# Patient Record
Sex: FEMALE
[De-identification: names, ages, dates, MRNs, and addresses within clinical notes are randomized per-mention and may not be internally consistent; named-entity substitution may affect disease eponyms.]

---

## 2017-05-27 ENCOUNTER — HEALTH MAINTENANCE LETTER (OUTPATIENT)
Age: 57
End: 2017-05-27

## 2019-02-06 ENCOUNTER — DIAGNOSTIC TRANS (OUTPATIENT)
Dept: OTHER | Age: 59
End: 2019-02-06

## 2019-02-06 ENCOUNTER — HOSPITAL (OUTPATIENT)
Dept: OTHER | Age: 59
End: 2019-02-06

## 2019-02-06 LAB
ALBUMIN SERPL-MCNC: 3.2 GM/DL (ref 3.6–5.1)
ALBUMIN/GLOB SERPL: 0.6 {RATIO} (ref 1–2.4)
ALP SERPL-CCNC: 86 UNIT/L (ref 45–117)
ALT SERPL-CCNC: 52 UNIT/L
ANALYZER ANC (IANC): ABNORMAL
ANION GAP SERPL CALC-SCNC: 10 MMOL/L (ref 10–20)
APTT PPP: 28 SECONDS (ref 22–32)
APTT PPP: NORMAL S
AST SERPL-CCNC: 46 UNIT/L
BASOPHILS # BLD: 0 THOUSAND/MCL (ref 0–0.3)
BASOPHILS NFR BLD: 0 %
BILIRUB SERPL-MCNC: 0.3 MG/DL (ref 0.2–1)
BUN SERPL-MCNC: 55 MG/DL (ref 6–20)
BUN/CREAT SERPL: 35 (ref 7–25)
CALCIUM SERPL-MCNC: 9.8 MG/DL (ref 8.4–10.2)
CHLORIDE: 103 MMOL/L (ref 98–107)
CO2 SERPL-SCNC: 24 MMOL/L (ref 21–32)
CREAT SERPL-MCNC: 1.57 MG/DL (ref 0.51–0.95)
DIFFERENTIAL METHOD BLD: ABNORMAL
EOSINOPHIL # BLD: 0 THOUSAND/MCL (ref 0.1–0.5)
EOSINOPHIL NFR BLD: 0 %
ERYTHROCYTE [DISTWIDTH] IN BLOOD: 16 % (ref 11–15)
ETHANOL SERPL-MCNC: NORMAL MG/DL
GLOBULIN SER-MCNC: 5 GM/DL (ref 2–4)
GLUCOSE SERPL-MCNC: 124 MG/DL (ref 65–99)
HEMATOCRIT: 33.2 % (ref 36–46.5)
HGB BLD-MCNC: 10.4 GM/DL (ref 12–15.5)
IMM GRANULOCYTES # BLD AUTO: 0.1 THOUSAND/MCL (ref 0–0.2)
IMM GRANULOCYTES NFR BLD: 1 %
INR PPP: 1
LYMPHOCYTES # BLD: 0.8 THOUSAND/MCL (ref 1–4)
LYMPHOCYTES NFR BLD: 8 %
MCH RBC QN AUTO: 31.1 PG (ref 26–34)
MCHC RBC AUTO-ENTMCNC: 31.3 GM/DL (ref 32–36.5)
MCV RBC AUTO: 99.4 FL (ref 78–100)
MONOCYTES # BLD: 0.6 THOUSAND/MCL (ref 0.3–0.9)
MONOCYTES NFR BLD: 6 %
NEUTROPHILS # BLD: 8.5 THOUSAND/MCL (ref 1.8–7.7)
NEUTROPHILS NFR BLD: 85 %
NEUTS SEG NFR BLD: ABNORMAL %
NRBC (NRBCRE): 0 /100 WBC
PLATELET # BLD: 244 THOUSAND/MCL (ref 140–450)
POTASSIUM SERPL-SCNC: 6.2 MMOL/L (ref 3.4–5.1)
PROT SERPL-MCNC: 8.2 GM/DL (ref 6.4–8.2)
PROTHROMBIN TIME: 10.4 SECONDS (ref 9.7–11.8)
PROTHROMBIN TIME: NORMAL
RBC # BLD: 3.34 MILLION/MCL (ref 4–5.2)
SODIUM SERPL-SCNC: 131 MMOL/L (ref 135–145)
TROPONIN I SERPL HS-MCNC: <0.02 NG/ML
WBC # BLD: 10 THOUSAND/MCL (ref 4.2–11)

## 2019-02-07 ENCOUNTER — HOSPITAL (OUTPATIENT)
Dept: OTHER | Age: 59
End: 2019-02-07
Attending: INTERNAL MEDICINE

## 2019-02-07 ENCOUNTER — HOSPITAL (OUTPATIENT)
Dept: OTHER | Age: 59
End: 2019-02-07

## 2019-02-07 LAB
ALBUMIN SERPL-MCNC: 2.7 GM/DL (ref 3.6–5.1)
ALBUMIN SERPL-MCNC: 2.9 GM/DL (ref 3.6–5.1)
ALBUMIN/GLOB SERPL: 0.6 {RATIO} (ref 1–2.4)
ALBUMIN/GLOB SERPL: 0.6 {RATIO} (ref 1–2.4)
ALP SERPL-CCNC: 75 UNIT/L (ref 45–117)
ALP SERPL-CCNC: 83 UNIT/L (ref 45–117)
ALT SERPL-CCNC: 41 UNIT/L
ALT SERPL-CCNC: 44 UNIT/L
AMMONIA PLAS-SCNC: 54 MCMOL/L
AMORPH SED URNS QL MICRO: NORMAL
AMPHETAMINES UR QL SCN>500 NG/ML: NEGATIVE
ANALYZER ANC (IANC): ABNORMAL
ANALYZER ANC (IANC): ABNORMAL
ANION GAP SERPL CALC-SCNC: 6 MMOL/L (ref 10–20)
ANION GAP SERPL CALC-SCNC: 8 MMOL/L (ref 10–20)
ANION GAP SERPL CALC-SCNC: 9 MMOL/L (ref 10–20)
APPEARANCE UR: CLEAR
APTT PPP: 27 SECONDS (ref 22–32)
APTT PPP: NORMAL S
AST SERPL-CCNC: 44 UNIT/L
AST SERPL-CCNC: 46 UNIT/L
BACTERIA #/AREA URNS HPF: NORMAL /HPF
BARBITURATES UR QL SCN>200 NG/ML: NEGATIVE
BASE DEFICIT BLDA-SCNC: 1 MMOL/L (ref 0–2)
BASE DEFICIT BLDA-SCNC: 1 MMOL/L (ref 0–2)
BASE DEFICIT BLDA-SCNC: 2 MMOL/L (ref 0–2)
BASE DEFICIT BLDA-SCNC: 2 MMOL/L (ref 0–2)
BASE EXCESS BLDA CALC-SCNC: ABNORMAL MMOL/L
BASOPHILS # BLD: 0 THOUSAND/MCL (ref 0–0.3)
BASOPHILS # BLD: 0 THOUSAND/MCL (ref 0–0.3)
BASOPHILS NFR BLD: 0 %
BASOPHILS NFR BLD: 0 %
BDY SITE: ABNORMAL
BENZODIAZ UR QL SCN>200 NG/ML: NEGATIVE
BILIRUB SERPL-MCNC: 0.4 MG/DL (ref 0.2–1)
BILIRUB SERPL-MCNC: 0.5 MG/DL (ref 0.2–1)
BILIRUB UR QL: NEGATIVE
BODY TEMPERATURE: 36.9 DEGREES
BODY TEMPERATURE: 37 DEGREES
BUN SERPL-MCNC: 40 MG/DL (ref 6–20)
BUN SERPL-MCNC: 46 MG/DL (ref 6–20)
BUN SERPL-MCNC: 46 MG/DL (ref 6–20)
BUN/CREAT SERPL: 36 (ref 7–25)
BUN/CREAT SERPL: 37 (ref 7–25)
BUN/CREAT SERPL: 38 (ref 7–25)
BZE UR QL SCN>150 NG/ML: NEGATIVE
CA-I BLD ISE-SCNC: 1.49 MMOL/L (ref 1.15–1.29)
CA-I BLD ISE-SCNC: 1.54 MMOL/L (ref 1.15–1.29)
CA-I BLDA-SCNC: 13 % (ref 15–23)
CA-I BLDA-SCNC: 14 % (ref 15–23)
CALCIUM SERPL-MCNC: 9.7 MG/DL (ref 8.4–10.2)
CALCIUM SERPL-MCNC: 9.8 MG/DL (ref 8.4–10.2)
CALCIUM SERPL-MCNC: 9.9 MG/DL (ref 8.4–10.2)
CANNABINOIDS UR QL SCN>50 NG/ML: NEGATIVE
CAOX CRY URNS QL MICRO: NORMAL
CHLORIDE: 107 MMOL/L (ref 98–107)
CHLORIDE: 108 MMOL/L (ref 98–107)
CHLORIDE: 111 MMOL/L (ref 98–107)
CK SERPL-CCNC: 836 UNIT/L (ref 26–192)
CO2 SERPL-SCNC: 27 MMOL/L (ref 21–32)
CO2 SERPL-SCNC: 28 MMOL/L (ref 21–32)
CO2 SERPL-SCNC: 28 MMOL/L (ref 21–32)
COHGB MFR BLD: 1.4 %
COHGB MFR BLD: 2.3 %
COLOR UR: YELLOW
CONDITION: ABNORMAL
CREAT SERPL-MCNC: 1.05 MG/DL (ref 0.51–0.95)
CREAT SERPL-MCNC: 1.24 MG/DL (ref 0.51–0.95)
CREAT SERPL-MCNC: 1.27 MG/DL (ref 0.51–0.95)
DIFFERENTIAL METHOD BLD: ABNORMAL
DIFFERENTIAL METHOD BLD: ABNORMAL
EOSINOPHIL # BLD: 0 THOUSAND/MCL (ref 0.1–0.5)
EOSINOPHIL # BLD: 0 THOUSAND/MCL (ref 0.1–0.5)
EOSINOPHIL NFR BLD: 0 %
EOSINOPHIL NFR BLD: 0 %
EPITH CASTS #/AREA URNS LPF: NORMAL /[LPF]
ERYTHROCYTE [DISTWIDTH] IN BLOOD: 16.1 % (ref 11–15)
ERYTHROCYTE [DISTWIDTH] IN BLOOD: 16.3 % (ref 11–15)
FATTY CASTS #/AREA URNS LPF: NORMAL /[LPF]
GLOBULIN SER-MCNC: 4.6 GM/DL (ref 2–4)
GLOBULIN SER-MCNC: 4.8 GM/DL (ref 2–4)
GLUCOSE BLDC GLUCOMTR-MCNC: 136 MG/DL (ref 65–99)
GLUCOSE BLDC GLUCOMTR-MCNC: 140 MG/DL (ref 65–99)
GLUCOSE BLDC GLUCOMTR-MCNC: 150 MG/DL (ref 65–99)
GLUCOSE BLDC GLUCOMTR-MCNC: 184 MG/DL (ref 65–99)
GLUCOSE BLDC GLUCOMTR-MCNC: 212 MG/DL (ref 65–99)
GLUCOSE BLDC GLUCOMTR-MCNC: 58 MG/DL (ref 65–99)
GLUCOSE BLDC GLUCOMTR-MCNC: 62 MG/DL (ref 65–99)
GLUCOSE SERPL-MCNC: 110 MG/DL (ref 65–99)
GLUCOSE SERPL-MCNC: 126 MG/DL (ref 65–99)
GLUCOSE SERPL-MCNC: 68 MG/DL (ref 65–99)
GLUCOSE UR-MCNC: NEGATIVE MG/DL
GRAN CASTS #/AREA URNS LPF: NORMAL /[LPF]
HCO3 BLDA-SCNC: 26 MMOL/L (ref 22–28)
HCO3 BLDA-SCNC: 26 MMOL/L (ref 22–28)
HCO3 BLDA-SCNC: 27 MMOL/L (ref 22–28)
HCO3 BLDA-SCNC: 27 MMOL/L (ref 22–28)
HEMATOCRIT: 30.5 % (ref 36–46.5)
HEMATOCRIT: 32.5 % (ref 36–46.5)
HGB BLD-MCNC: 10.1 GM/DL (ref 12–15.5)
HGB BLD-MCNC: 10.4 GM/DL (ref 12–15.5)
HGB BLD-MCNC: 9.5 GM/DL (ref 12–15.5)
HGB BLD-MCNC: 9.5 GM/DL (ref 12–15.5)
HGB UR QL: NEGATIVE
HOROWITZ INDEX BLD+IHG-RTO: ABNORMAL MM[HG]
HYALINE CASTS #/AREA URNS LPF: NORMAL /LPF (ref 0–5)
IMM GRANULOCYTES # BLD AUTO: 0 THOUSAND/MCL (ref 0–0.2)
IMM GRANULOCYTES # BLD AUTO: 0.1 THOUSAND/MCL (ref 0–0.2)
IMM GRANULOCYTES NFR BLD: 1 %
IMM GRANULOCYTES NFR BLD: 1 %
INR PPP: 1
KETONES UR-MCNC: NEGATIVE MG/DL
LACTATE BLDA-MCNC: 0.4 MMOL/L
LEUKOCYTE ESTERASE UR QL STRIP: NEGATIVE
LYMPHOCYTES # BLD: 0.9 THOUSAND/MCL (ref 1–4)
LYMPHOCYTES # BLD: 1.1 THOUSAND/MCL (ref 1–4)
LYMPHOCYTES NFR BLD: 11 %
LYMPHOCYTES NFR BLD: 13 %
MAGNESIUM SERPL-MCNC: 2.2 MG/DL (ref 1.7–2.4)
MCH RBC QN AUTO: 30.8 PG (ref 26–34)
MCH RBC QN AUTO: 31.2 PG (ref 26–34)
MCHC RBC AUTO-ENTMCNC: 31.1 GM/DL (ref 32–36.5)
MCHC RBC AUTO-ENTMCNC: 31.1 GM/DL (ref 32–36.5)
MCV RBC AUTO: 100.3 FL (ref 78–100)
MCV RBC AUTO: 99 FL (ref 78–100)
METHGB MFR BLD: 0.1 %
METHGB MFR BLD: 0.5 %
MIXED CELL CASTS #/AREA URNS LPF: NORMAL /[LPF]
MONOCYTES # BLD: 0.8 THOUSAND/MCL (ref 0.3–0.9)
MONOCYTES # BLD: 0.9 THOUSAND/MCL (ref 0.3–0.9)
MONOCYTES NFR BLD: 10 %
MONOCYTES NFR BLD: 9 %
MUCOUS THREADS URNS QL MICRO: PRESENT
NEUTROPHILS # BLD: 6.6 THOUSAND/MCL (ref 1.8–7.7)
NEUTROPHILS # BLD: 6.6 THOUSAND/MCL (ref 1.8–7.7)
NEUTROPHILS NFR BLD: 76 %
NEUTROPHILS NFR BLD: 79 %
NEUTS SEG NFR BLD: ABNORMAL %
NEUTS SEG NFR BLD: ABNORMAL %
NITRITE UR QL: NEGATIVE
NRBC (NRBCRE): 0 /100 WBC
NRBC (NRBCRE): 0 /100 WBC
NT-PROBNP SERPL-MCNC: 176 PG/ML
OPIATES UR QL SCN>300 NG/ML: NEGATIVE
OXYHGB MFR BLD: 96 % (ref 94–98)
OXYHGB MFR BLD: 96.3 % (ref 94–98)
PAO2 / FIO2 RATIO (RPFR): 268 (ref 300–500)
PAO2 / FIO2 RATIO (RPFR): 320 (ref 300–500)
PAO2 / FIO2 RATIO (RPFR): 358 (ref 300–500)
PAO2 / FIO2 RATIO (RPFR): ABNORMAL
PCO2 BLDA: 53 MM HG (ref 32–45)
PCO2 BLDA: 57 MM HG (ref 32–45)
PCO2 BLDA: 65 MM HG (ref 32–45)
PCO2 BLDA: 65 MM HG (ref 32–45)
PCP UR QL SCN>25 NG/ML: NEGATIVE
PH BLDA: 7.23 UNIT (ref 7.35–7.45)
PH BLDA: 7.23 UNIT (ref 7.35–7.45)
PH BLDA: 7.27 UNIT (ref 7.35–7.45)
PH BLDA: 7.29 UNIT (ref 7.35–7.45)
PH UR: 5 UNIT (ref 5–7)
PHOSPHATE SERPL-MCNC: 2.1 MG/DL (ref 2.4–4.7)
PLATELET # BLD: 198 THOUSAND/MCL (ref 140–450)
PLATELET # BLD: 216 THOUSAND/MCL (ref 140–450)
PO2 BLDA: 107 MM HG (ref 83–108)
PO2 BLDA: 127 MM HG (ref 83–108)
PO2 BLDA: 143 MM HG (ref 83–108)
PO2 BLDA: 83 MM HG (ref 83–108)
POTASSIUM BLD-SCNC: 5.3 MMOL/L (ref 3.4–5.1)
POTASSIUM BLD-SCNC: 5.4 MMOL/L (ref 3.4–5.1)
POTASSIUM SERPL-SCNC: 4.8 MMOL/L (ref 3.4–5.1)
POTASSIUM SERPL-SCNC: 5 MMOL/L (ref 3.4–5.1)
POTASSIUM SERPL-SCNC: 5.2 MMOL/L (ref 3.4–5.1)
PROCALCITONIN SERPL IA-MCNC: 0.11 NG/ML
PROT SERPL-MCNC: 7.3 GM/DL (ref 6.4–8.2)
PROT SERPL-MCNC: 7.7 GM/DL (ref 6.4–8.2)
PROT UR QL: NEGATIVE MG/DL
PROTHROMBIN TIME: 10.3 SECONDS (ref 9.7–11.8)
PROTHROMBIN TIME: NORMAL
RBC # BLD: 3.08 MILLION/MCL (ref 4–5.2)
RBC # BLD: 3.24 MILLION/MCL (ref 4–5.2)
RBC #/AREA URNS HPF: NORMAL /HPF (ref 0–3)
RBC CASTS #/AREA URNS LPF: NORMAL /[LPF]
RENAL EPI CELLS #/AREA URNS HPF: NORMAL /[HPF]
SAO2 % BLDA: 94 % (ref 95–99)
SAO2 % BLDA: 98 % (ref 95–99)
SAO2 % BLDA: 99 % (ref 95–99)
SAO2 % BLDA: 99 % (ref 95–99)
SODIUM BLD-SCNC: 137 MMOL/L (ref 135–145)
SODIUM BLD-SCNC: 139 MMOL/L (ref 135–145)
SODIUM SERPL-SCNC: 137 MMOL/L (ref 135–145)
SODIUM SERPL-SCNC: 140 MMOL/L (ref 135–145)
SODIUM SERPL-SCNC: 140 MMOL/L (ref 135–145)
SP GR UR: 1.02 (ref 1–1.03)
SPECIMEN SOURCE: NORMAL
SPERM URNS QL MICRO: NORMAL
SQUAMOUS #/AREA URNS HPF: NORMAL /HPF (ref 0–5)
T VAGINALIS URNS QL MICRO: NORMAL
TRI-PHOS CRY URNS QL MICRO: NORMAL
TROPONIN I SERPL HS-MCNC: <0.02 NG/ML
TSH SERPL-ACNC: 2.22 MCUNIT/ML (ref 0.35–5)
URATE CRY URNS QL MICRO: NORMAL
UROBILINOGEN UR QL: 0.2 MG/DL (ref 0–1)
WAXY CASTS #/AREA URNS LPF: NORMAL /[LPF]
WBC # BLD: 8.3 THOUSAND/MCL (ref 4.2–11)
WBC # BLD: 8.7 THOUSAND/MCL (ref 4.2–11)
WBC #/AREA URNS HPF: NORMAL /HPF (ref 0–5)
WBC CASTS #/AREA URNS LPF: NORMAL /[LPF]
YEAST HYPHAE URNS QL MICRO: NORMAL
YEAST URNS QL MICRO: NORMAL

## 2019-02-08 LAB
ALBUMIN SERPL-MCNC: 2.7 GM/DL (ref 3.6–5.1)
ALBUMIN/GLOB SERPL: 0.4 {RATIO} (ref 1–2.4)
ALP SERPL-CCNC: 70 UNIT/L (ref 45–117)
ALT SERPL-CCNC: 40 UNIT/L
ANALYZER ANC (IANC): ABNORMAL
ANION GAP SERPL CALC-SCNC: 11 MMOL/L (ref 10–20)
ANION GAP SERPL CALC-SCNC: 7 MMOL/L (ref 10–20)
AST SERPL-CCNC: 38 UNIT/L
BASE DEFICIT BLDA-SCNC: 3 MMOL/L (ref 0–2)
BASE EXCESS BLDA CALC-SCNC: ABNORMAL MMOL/L
BASOPHILS # BLD: 0 THOUSAND/MCL (ref 0–0.3)
BASOPHILS NFR BLD: 0 %
BDY SITE: ABNORMAL
BILIRUB SERPL-MCNC: 0.3 MG/DL (ref 0.2–1)
BODY TEMPERATURE: 37 DEGREES
BUN SERPL-MCNC: 43 MG/DL (ref 6–20)
BUN SERPL-MCNC: 53 MG/DL (ref 6–20)
BUN/CREAT SERPL: 36 (ref 7–25)
BUN/CREAT SERPL: 39 (ref 7–25)
CA-I BLD ISE-SCNC: 1.41 MMOL/L (ref 1.15–1.29)
CA-I BLDA-SCNC: 12 % (ref 15–23)
CALCIUM SERPL-MCNC: 8.8 MG/DL (ref 8.4–10.2)
CALCIUM SERPL-MCNC: 9.5 MG/DL (ref 8.4–10.2)
CHLORIDE: 108 MMOL/L (ref 98–107)
CHLORIDE: 113 MMOL/L (ref 98–107)
CO2 SERPL-SCNC: 24 MMOL/L (ref 21–32)
CO2 SERPL-SCNC: 27 MMOL/L (ref 21–32)
COHGB MFR BLD: 0.8 %
CONDITION: ABNORMAL
CONDITION: ABNORMAL
CREAT SERPL-MCNC: 1.1 MG/DL (ref 0.51–0.95)
CREAT SERPL-MCNC: 1.46 MG/DL (ref 0.51–0.95)
DIFFERENTIAL METHOD BLD: ABNORMAL
EOSINOPHIL # BLD: 0 THOUSAND/MCL (ref 0.1–0.5)
EOSINOPHIL NFR BLD: 0 %
ERYTHROCYTE [DISTWIDTH] IN BLOOD: 16.9 % (ref 11–15)
GLOBULIN SER-MCNC: 6.4 GM/DL (ref 2–4)
GLUCOSE BLDC GLUCOMTR-MCNC: 134 MG/DL (ref 65–99)
GLUCOSE BLDC GLUCOMTR-MCNC: 162 MG/DL (ref 65–99)
GLUCOSE BLDC GLUCOMTR-MCNC: 180 MG/DL (ref 65–99)
GLUCOSE BLDC GLUCOMTR-MCNC: 201 MG/DL (ref 65–99)
GLUCOSE SERPL-MCNC: 123 MG/DL (ref 65–99)
GLUCOSE SERPL-MCNC: 258 MG/DL (ref 65–99)
HCO3 BLDA-SCNC: 23 MMOL/L (ref 22–28)
HEMATOCRIT: 29.9 % (ref 36–46.5)
HGB BLD-MCNC: 8.8 GM/DL (ref 12–15.5)
HGB BLD-MCNC: 9.1 GM/DL (ref 12–15.5)
HOROWITZ INDEX BLD+IHG-RTO: ABNORMAL MM[HG]
IMM GRANULOCYTES # BLD AUTO: 0 THOUSAND/MCL (ref 0–0.2)
IMM GRANULOCYTES NFR BLD: 0 %
LACTATE BLDA-MCNC: 1.2 MMOL/L
LYMPHOCYTES # BLD: 0.7 THOUSAND/MCL (ref 1–4)
LYMPHOCYTES NFR BLD: 7 %
MAGNESIUM SERPL-MCNC: 2.3 MG/DL (ref 1.7–2.4)
MCH RBC QN AUTO: 30.5 PG (ref 26–34)
MCHC RBC AUTO-ENTMCNC: 30.4 GM/DL (ref 32–36.5)
MCV RBC AUTO: 100.3 FL (ref 78–100)
METHGB MFR BLD: 0.9 %
MONOCYTES # BLD: 0.9 THOUSAND/MCL (ref 0.3–0.9)
MONOCYTES NFR BLD: 10 %
NEUTROPHILS # BLD: 7.7 THOUSAND/MCL (ref 1.8–7.7)
NEUTROPHILS NFR BLD: 83 %
NEUTS SEG NFR BLD: ABNORMAL %
NRBC (NRBCRE): 0 /100 WBC
OXYHGB MFR BLD: 96.8 % (ref 94–98)
PAO2 / FIO2 RATIO (RPFR): 325 (ref 300–500)
PCO2 BLDA: 47 MM HG (ref 32–45)
PH BLDA: 7.3 UNIT (ref 7.35–7.45)
PHOSPHATE SERPL-MCNC: 2.7 MG/DL (ref 2.4–4.7)
PLATELET # BLD: 166 THOUSAND/MCL (ref 140–450)
PO2 BLDA: 130 MM HG (ref 83–108)
POTASSIUM BLD-SCNC: 5.4 MMOL/L (ref 3.4–5.1)
POTASSIUM SERPL-SCNC: 5.2 MMOL/L (ref 3.4–5.1)
POTASSIUM SERPL-SCNC: 5.9 MMOL/L (ref 3.4–5.1)
PROT SERPL-MCNC: 9.1 GM/DL (ref 6.4–8.2)
RBC # BLD: 2.98 MILLION/MCL (ref 4–5.2)
SAO2 % BLDA: 98 % (ref 95–99)
SODIUM BLD-SCNC: 137 MMOL/L (ref 135–145)
SODIUM SERPL-SCNC: 137 MMOL/L (ref 135–145)
SODIUM SERPL-SCNC: 142 MMOL/L (ref 135–145)
TRIGLYCERIDE (TRIGP): 160 MG/DL
WBC # BLD: 9.4 THOUSAND/MCL (ref 4.2–11)

## 2019-02-09 ENCOUNTER — HOSPITAL (OUTPATIENT)
Dept: OTHER | Age: 59
End: 2019-02-09

## 2019-02-09 LAB
%HEPARIN INHIBITED (PF4INH): NORMAL
ALBUMIN SERPL-MCNC: 2.4 GM/DL (ref 3.6–5.1)
ALBUMIN/GLOB SERPL: 0.4 {RATIO} (ref 1–2.4)
ALP SERPL-CCNC: 63 UNIT/L (ref 45–117)
ALT SERPL-CCNC: 39 UNIT/L
ANALYZER ANC (IANC): ABNORMAL
ANION GAP SERPL CALC-SCNC: 5 MMOL/L (ref 10–20)
AST SERPL-CCNC: 32 UNIT/L
BASE DEFICIT BLDA-SCNC: ABNORMAL MMOL/L
BASE DEFICIT BLDA-SCNC: ABNORMAL MMOL/L
BASE EXCESS BLDA CALC-SCNC: 0 MMOL/L (ref 0–3)
BASE EXCESS BLDA CALC-SCNC: 1 MMOL/L (ref 0–3)
BASOPHILS # BLD: 0 THOUSAND/MCL (ref 0–0.3)
BASOPHILS NFR BLD: 0 %
BDY SITE: ABNORMAL
BDY SITE: ABNORMAL
BILIRUB SERPL-MCNC: 0.4 MG/DL (ref 0.2–1)
BODY TEMPERATURE: 37 DEGREES
BODY TEMPERATURE: 37 DEGREES
BUN SERPL-MCNC: 32 MG/DL (ref 6–20)
BUN/CREAT SERPL: 34 (ref 7–25)
CALCIUM SERPL-MCNC: 9.1 MG/DL (ref 8.4–10.2)
CHLORIDE: 112 MMOL/L (ref 98–107)
CO2 SERPL-SCNC: 28 MMOL/L (ref 21–32)
CONDITION: ABNORMAL
CREAT SERPL-MCNC: 0.94 MG/DL (ref 0.51–0.95)
DIFFERENTIAL METHOD BLD: ABNORMAL
EOSINOPHIL # BLD: 0 THOUSAND/MCL (ref 0.1–0.5)
EOSINOPHIL NFR BLD: 0 %
ERYTHROCYTE [DISTWIDTH] IN BLOOD: 17 % (ref 11–15)
GLOBULIN SER-MCNC: 6.4 GM/DL (ref 2–4)
GLUCOSE BLDC GLUCOMTR-MCNC: 228 MG/DL (ref 65–99)
GLUCOSE BLDC GLUCOMTR-MCNC: 237 MG/DL (ref 65–99)
GLUCOSE BLDC GLUCOMTR-MCNC: 240 MG/DL (ref 65–99)
GLUCOSE BLDC GLUCOMTR-MCNC: 362 MG/DL (ref 65–99)
GLUCOSE SERPL-MCNC: 263 MG/DL (ref 65–99)
HCO3 BLDA-SCNC: 26 MMOL/L (ref 22–28)
HCO3 BLDA-SCNC: 26 MMOL/L (ref 22–28)
HEMATOCRIT: 26.4 % (ref 36–46.5)
HGB BLD-MCNC: 8.3 GM/DL (ref 12–15.5)
HOROWITZ INDEX BLD+IHG-RTO: ABNORMAL MM[HG]
HOROWITZ INDEX BLD+IHG-RTO: ABNORMAL MM[HG]
IGG INTERPRETATION (PF4GI): NORMAL
IMM GRANULOCYTES # BLD AUTO: 0 THOUSAND/MCL (ref 0–0.2)
IMM GRANULOCYTES NFR BLD: 0 %
LACTATE BLDA-MCNC: 0.7 MMOL/L
LACTATE BLDA-MCNC: 1.1 MMOL/L
LYMPHOCYTES # BLD: 0.6 THOUSAND/MCL (ref 1–4)
LYMPHOCYTES NFR BLD: 11 %
MAGNESIUM SERPL-MCNC: 2.5 MG/DL (ref 1.7–2.4)
MCH RBC QN AUTO: 31 PG (ref 26–34)
MCHC RBC AUTO-ENTMCNC: 31.4 GM/DL (ref 32–36.5)
MCV RBC AUTO: 98.5 FL (ref 78–100)
MONOCYTES # BLD: 0.6 THOUSAND/MCL (ref 0.3–0.9)
MONOCYTES NFR BLD: 10 %
NEUTROPHILS # BLD: 4.5 THOUSAND/MCL (ref 1.8–7.7)
NEUTROPHILS NFR BLD: 79 %
NEUTS SEG NFR BLD: ABNORMAL %
NRBC (NRBCRE): 0 /100 WBC
PAO2 / FIO2 RATIO (RPFR): 335 (ref 300–500)
PAO2 / FIO2 RATIO (RPFR): 478 (ref 300–500)
PCO2 BLDA: 40 MM HG (ref 32–45)
PCO2 BLDA: 45 MM HG (ref 32–45)
PF4 IGG OPTICAL DENSITY (PF4GOD): 0.34
PF4 IGG RESULT (PF4G1): NEGATIVE
PH BLDA: 7.37 UNIT (ref 7.35–7.45)
PH BLDA: 7.42 UNIT (ref 7.35–7.45)
PHOSPHATE SERPL-MCNC: 1.5 MG/DL (ref 2.4–4.7)
PLATELET # BLD: 129 THOUSAND/MCL (ref 140–450)
PO2 BLDA: 134 MM HG (ref 83–108)
PO2 BLDA: 191 MM HG (ref 83–108)
POTASSIUM SERPL-SCNC: 4.8 MMOL/L (ref 3.4–5.1)
PROCALCITONIN SERPL IA-MCNC: 0.12 NG/ML
PROT SERPL-MCNC: 8.8 GM/DL (ref 6.4–8.2)
RBC # BLD: 2.68 MILLION/MCL (ref 4–5.2)
SAO2 % BLDA: 100 % (ref 95–99)
SAO2 % BLDA: 99 % (ref 95–99)
SODIUM SERPL-SCNC: 140 MMOL/L (ref 135–145)
WBC # BLD: 5.7 THOUSAND/MCL (ref 4.2–11)

## 2019-02-10 LAB
ALBUMIN SERPL-MCNC: 2.1 GM/DL (ref 3.6–5.1)
ALBUMIN/GLOB SERPL: 0.3 {RATIO} (ref 1–2.4)
ALP SERPL-CCNC: 60 UNIT/L (ref 45–117)
ALT SERPL-CCNC: 34 UNIT/L
ANALYZER ANC (IANC): ABNORMAL
ANION GAP SERPL CALC-SCNC: 6 MMOL/L (ref 10–20)
AST SERPL-CCNC: 24 UNIT/L
BASE DEFICIT BLDA-SCNC: 1 MMOL/L (ref 0–2)
BASE DEFICIT BLDA-SCNC: ABNORMAL MMOL/L
BASE EXCESS BLDA CALC-SCNC: 3 MMOL/L (ref 0–3)
BASE EXCESS BLDA CALC-SCNC: ABNORMAL MMOL/L
BASOPHILS # BLD: 0 THOUSAND/MCL (ref 0–0.3)
BASOPHILS NFR BLD: 0 %
BDY SITE: ABNORMAL
BDY SITE: ABNORMAL
BILIRUB SERPL-MCNC: 0.3 MG/DL (ref 0.2–1)
BODY TEMPERATURE: 37 DEGREES
BODY TEMPERATURE: 37 DEGREES
BUN SERPL-MCNC: 30 MG/DL (ref 6–20)
BUN/CREAT SERPL: 32 (ref 7–25)
CA-I BLD ISE-SCNC: 1.38 MMOL/L (ref 1.15–1.29)
CA-I BLDA-SCNC: 12 % (ref 15–23)
CALCIUM SERPL-MCNC: 8.7 MG/DL (ref 8.4–10.2)
CHLORIDE: 118 MMOL/L (ref 98–107)
CO2 SERPL-SCNC: 27 MMOL/L (ref 21–32)
COHGB MFR BLD: 0.6 %
CONDITION: ABNORMAL
CREAT SERPL-MCNC: 0.94 MG/DL (ref 0.51–0.95)
DIFFERENTIAL METHOD BLD: ABNORMAL
EOSINOPHIL # BLD: 0 THOUSAND/MCL (ref 0.1–0.5)
EOSINOPHIL NFR BLD: 0 %
ERYTHROCYTE [DISTWIDTH] IN BLOOD: 17 % (ref 11–15)
GLOBULIN SER-MCNC: 6.8 GM/DL (ref 2–4)
GLUCOSE BLDC GLUCOMTR-MCNC: 189 MG/DL (ref 65–99)
GLUCOSE BLDC GLUCOMTR-MCNC: 220 MG/DL (ref 65–99)
GLUCOSE BLDC GLUCOMTR-MCNC: 258 MG/DL (ref 65–99)
GLUCOSE SERPL-MCNC: 287 MG/DL (ref 65–99)
HCO3 BLDA-SCNC: 26 MMOL/L (ref 22–28)
HCO3 BLDA-SCNC: 29 MMOL/L (ref 22–28)
HEMATOCRIT: 25.6 % (ref 36–46.5)
HGB BLD-MCNC: 8.1 GM/DL (ref 12–15.5)
HGB BLD-MCNC: 8.5 GM/DL (ref 12–15.5)
HOROWITZ INDEX BLD+IHG-RTO: ABNORMAL MM[HG]
HOROWITZ INDEX BLD+IHG-RTO: ABNORMAL MM[HG]
IMM GRANULOCYTES # BLD AUTO: 0 THOUSAND/MCL (ref 0–0.2)
IMM GRANULOCYTES NFR BLD: 1 %
LACTATE BLDA-MCNC: 1.2 MMOL/L
LACTATE BLDA-MCNC: 1.2 MMOL/L
LYMPHOCYTES # BLD: 0.6 THOUSAND/MCL (ref 1–4)
LYMPHOCYTES NFR BLD: 11 %
MAGNESIUM SERPL-MCNC: 2.5 MG/DL (ref 1.7–2.4)
MCH RBC QN AUTO: 31 PG (ref 26–34)
MCHC RBC AUTO-ENTMCNC: 31.6 GM/DL (ref 32–36.5)
MCV RBC AUTO: 98.1 FL (ref 78–100)
METHGB MFR BLD: 0 %
MONOCYTES # BLD: 0.6 THOUSAND/MCL (ref 0.3–0.9)
MONOCYTES NFR BLD: 10 %
NEUTROPHILS # BLD: 4.3 THOUSAND/MCL (ref 1.8–7.7)
NEUTROPHILS NFR BLD: 78 %
NEUTS SEG NFR BLD: ABNORMAL %
NRBC (NRBCRE): 0 /100 WBC
OXYHGB MFR BLD: 98.2 % (ref 94–98)
PAO2 / FIO2 RATIO (RPFR): 300 (ref 300–500)
PAO2 / FIO2 RATIO (RPFR): 308 (ref 300–500)
PCO2 BLDA: 49 MM HG (ref 32–45)
PCO2 BLDA: 52 MM HG (ref 32–45)
PH BLDA: 7.31 UNIT (ref 7.35–7.45)
PH BLDA: 7.38 UNIT (ref 7.35–7.45)
PHOSPHATE SERPL-MCNC: 1.6 MG/DL (ref 2.4–4.7)
PLATELET # BLD: 151 THOUSAND/MCL (ref 140–450)
PO2 BLDA: 120 MM HG (ref 83–108)
PO2 BLDA: 123 MM HG (ref 83–108)
POTASSIUM BLD-SCNC: 4.8 MMOL/L (ref 3.4–5.1)
POTASSIUM SERPL-SCNC: 4.5 MMOL/L (ref 3.4–5.1)
PROT SERPL-MCNC: 8.9 GM/DL (ref 6.4–8.2)
RBC # BLD: 2.61 MILLION/MCL (ref 4–5.2)
SAO2 % BLDA: 98 % (ref 95–99)
SAO2 % BLDA: 99 % (ref 95–99)
SODIUM BLD-SCNC: 145 MMOL/L (ref 135–145)
SODIUM SERPL-SCNC: 147 MMOL/L (ref 135–145)
TRIGLYCERIDE (TRIGP): 80 MG/DL
WBC # BLD: 5.5 THOUSAND/MCL (ref 4.2–11)

## 2019-02-11 ENCOUNTER — HOSPITAL (OUTPATIENT)
Dept: OTHER | Age: 59
End: 2019-02-11

## 2019-02-11 LAB
ALBUMIN SERPL-MCNC: 2.4 GM/DL (ref 3.6–5.1)
ALBUMIN/GLOB SERPL: 0.4 {RATIO} (ref 1–2.4)
ALP SERPL-CCNC: 59 UNIT/L (ref 45–117)
ALT SERPL-CCNC: 38 UNIT/L
ANALYZER ANC (IANC): ABNORMAL
ANION GAP SERPL CALC-SCNC: 6 MMOL/L (ref 10–20)
AST SERPL-CCNC: 29 UNIT/L
BASOPHILS # BLD: 0 THOUSAND/MCL (ref 0–0.3)
BASOPHILS NFR BLD: 0 %
BILIRUB SERPL-MCNC: 0.4 MG/DL (ref 0.2–1)
BUN SERPL-MCNC: 25 MG/DL (ref 6–20)
BUN/CREAT SERPL: 29 (ref 7–25)
CALCIUM SERPL-MCNC: 8.9 MG/DL (ref 8.4–10.2)
CHLORIDE: 112 MMOL/L (ref 98–107)
CO2 SERPL-SCNC: 27 MMOL/L (ref 21–32)
CREAT SERPL-MCNC: 0.86 MG/DL (ref 0.51–0.95)
DIFFERENTIAL METHOD BLD: ABNORMAL
EOSINOPHIL # BLD: 0 THOUSAND/MCL (ref 0.1–0.5)
EOSINOPHIL NFR BLD: 0 %
ERYTHROCYTE [DISTWIDTH] IN BLOOD: 15.9 % (ref 11–15)
GLOBULIN SER-MCNC: 6.5 GM/DL (ref 2–4)
GLUCOSE BLDC GLUCOMTR-MCNC: 163 MG/DL (ref 65–99)
GLUCOSE BLDC GLUCOMTR-MCNC: 232 MG/DL (ref 65–99)
GLUCOSE BLDC GLUCOMTR-MCNC: 89 MG/DL (ref 65–99)
GLUCOSE SERPL-MCNC: 126 MG/DL (ref 65–99)
HEMATOCRIT: 27.4 % (ref 36–46.5)
HGB BLD-MCNC: 8.7 GM/DL (ref 12–15.5)
IMM GRANULOCYTES # BLD AUTO: 0.1 THOUSAND/MCL (ref 0–0.2)
IMM GRANULOCYTES NFR BLD: 1 %
LYMPHOCYTES # BLD: 1.4 THOUSAND/MCL (ref 1–4)
LYMPHOCYTES NFR BLD: 19 %
MAGNESIUM SERPL-MCNC: 2 MG/DL (ref 1.7–2.4)
MCH RBC QN AUTO: 30.9 PG (ref 26–34)
MCHC RBC AUTO-ENTMCNC: 31.8 GM/DL (ref 32–36.5)
MCV RBC AUTO: 97.2 FL (ref 78–100)
MONOCYTES # BLD: 0.8 THOUSAND/MCL (ref 0.3–0.9)
MONOCYTES NFR BLD: 11 %
NEUTROPHILS # BLD: 5.2 THOUSAND/MCL (ref 1.8–7.7)
NEUTROPHILS NFR BLD: 69 %
NEUTS SEG NFR BLD: ABNORMAL %
NRBC (NRBCRE): 0 /100 WBC
PHOSPHATE SERPL-MCNC: 1.4 MG/DL (ref 2.4–4.7)
PLATELET # BLD: 158 THOUSAND/MCL (ref 140–450)
POTASSIUM SERPL-SCNC: 3.7 MMOL/L (ref 3.4–5.1)
PROCALCITONIN SERPL IA-MCNC: 0.09 NG/ML
PROT SERPL-MCNC: 8.9 GM/DL (ref 6.4–8.2)
RBC # BLD: 2.82 MILLION/MCL (ref 4–5.2)
SODIUM SERPL-SCNC: 141 MMOL/L (ref 135–145)
WBC # BLD: 7.5 THOUSAND/MCL (ref 4.2–11)

## 2019-02-12 LAB
GLUCOSE BLDC GLUCOMTR-MCNC: 171 MG/DL (ref 65–99)
GLUCOSE BLDC GLUCOMTR-MCNC: 177 MG/DL (ref 65–99)
GLUCOSE BLDC GLUCOMTR-MCNC: 200 MG/DL (ref 65–99)
GLUCOSE BLDC GLUCOMTR-MCNC: 215 MG/DL (ref 65–99)
GLUCOSE BLDC GLUCOMTR-MCNC: 234 MG/DL (ref 65–99)

## 2019-02-13 LAB
ANALYZER ANC (IANC): ABNORMAL
ANION GAP SERPL CALC-SCNC: 8 MMOL/L (ref 10–20)
BASOPHILS # BLD: 0 THOUSAND/MCL (ref 0–0.3)
BASOPHILS NFR BLD: 0 %
BUN SERPL-MCNC: 15 MG/DL (ref 6–20)
BUN/CREAT SERPL: 19 (ref 7–25)
CALCIUM SERPL-MCNC: 8.9 MG/DL (ref 8.4–10.2)
CHLORIDE: 108 MMOL/L (ref 98–107)
CO2 SERPL-SCNC: 25 MMOL/L (ref 21–32)
CREAT SERPL-MCNC: 0.78 MG/DL (ref 0.51–0.95)
DIFFERENTIAL METHOD BLD: ABNORMAL
EOSINOPHIL # BLD: 0 THOUSAND/MCL (ref 0.1–0.5)
EOSINOPHIL NFR BLD: 0 %
ERYTHROCYTE [DISTWIDTH] IN BLOOD: 15.6 % (ref 11–15)
GLUCOSE BLDC GLUCOMTR-MCNC: 145 MG/DL (ref 65–99)
GLUCOSE BLDC GLUCOMTR-MCNC: 163 MG/DL (ref 65–99)
GLUCOSE BLDC GLUCOMTR-MCNC: 212 MG/DL (ref 65–99)
GLUCOSE BLDC GLUCOMTR-MCNC: 216 MG/DL (ref 65–99)
GLUCOSE SERPL-MCNC: 158 MG/DL (ref 65–99)
HEMATOCRIT: 25 % (ref 36–46.5)
HGB BLD-MCNC: 8.2 GM/DL (ref 12–15.5)
IMM GRANULOCYTES # BLD AUTO: 0.1 THOUSAND/MCL (ref 0–0.2)
IMM GRANULOCYTES NFR BLD: 1 %
LYMPHOCYTES # BLD: 1.1 THOUSAND/MCL (ref 1–4)
LYMPHOCYTES NFR BLD: 17 %
MCH RBC QN AUTO: 30.9 PG (ref 26–34)
MCHC RBC AUTO-ENTMCNC: 32.8 GM/DL (ref 32–36.5)
MCV RBC AUTO: 94.3 FL (ref 78–100)
MONOCYTES # BLD: 0.6 THOUSAND/MCL (ref 0.3–0.9)
MONOCYTES NFR BLD: 9 %
NEUTROPHILS # BLD: 4.8 THOUSAND/MCL (ref 1.8–7.7)
NEUTROPHILS NFR BLD: 73 %
NEUTS SEG NFR BLD: ABNORMAL %
NRBC (NRBCRE): 0 /100 WBC
PLATELET # BLD: 175 THOUSAND/MCL (ref 140–450)
POTASSIUM SERPL-SCNC: 3.5 MMOL/L (ref 3.4–5.1)
RBC # BLD: 2.65 MILLION/MCL (ref 4–5.2)
SODIUM SERPL-SCNC: 137 MMOL/L (ref 135–145)
WBC # BLD: 6.6 THOUSAND/MCL (ref 4.2–11)

## 2019-02-14 LAB
ANALYZER ANC (IANC): ABNORMAL
ANION GAP SERPL CALC-SCNC: 8 MMOL/L (ref 10–20)
BASOPHILS # BLD: 0 THOUSAND/MCL (ref 0–0.3)
BASOPHILS NFR BLD: 0 %
BUN SERPL-MCNC: 16 MG/DL (ref 6–20)
BUN/CREAT SERPL: 20 (ref 7–25)
CALCIUM SERPL-MCNC: 9.3 MG/DL (ref 8.4–10.2)
CHLORIDE: 109 MMOL/L (ref 98–107)
CO2 SERPL-SCNC: 24 MMOL/L (ref 21–32)
CREAT SERPL-MCNC: 0.79 MG/DL (ref 0.51–0.95)
DIFFERENTIAL METHOD BLD: ABNORMAL
EOSINOPHIL # BLD: 0 THOUSAND/MCL (ref 0.1–0.5)
EOSINOPHIL NFR BLD: 0 %
ERYTHROCYTE [DISTWIDTH] IN BLOOD: 16 % (ref 11–15)
GLUCOSE BLDC GLUCOMTR-MCNC: 110 MG/DL (ref 65–99)
GLUCOSE BLDC GLUCOMTR-MCNC: 185 MG/DL (ref 65–99)
GLUCOSE BLDC GLUCOMTR-MCNC: 226 MG/DL (ref 65–99)
GLUCOSE SERPL-MCNC: 222 MG/DL (ref 65–99)
HEMATOCRIT: 25.6 % (ref 36–46.5)
HGB BLD-MCNC: 8.3 GM/DL (ref 12–15.5)
IMM GRANULOCYTES # BLD AUTO: 0.1 THOUSAND/MCL (ref 0–0.2)
IMM GRANULOCYTES NFR BLD: 1 %
LYMPHOCYTES # BLD: 1.1 THOUSAND/MCL (ref 1–4)
LYMPHOCYTES NFR BLD: 16 %
MCH RBC QN AUTO: 30.5 PG (ref 26–34)
MCHC RBC AUTO-ENTMCNC: 32.4 GM/DL (ref 32–36.5)
MCV RBC AUTO: 94.1 FL (ref 78–100)
MONOCYTES # BLD: 0.8 THOUSAND/MCL (ref 0.3–0.9)
MONOCYTES NFR BLD: 11 %
NEUTROPHILS # BLD: 4.9 THOUSAND/MCL (ref 1.8–7.7)
NEUTROPHILS NFR BLD: 72 %
NEUTS SEG NFR BLD: ABNORMAL %
NRBC (NRBCRE): 0 /100 WBC
PLATELET # BLD: 186 THOUSAND/MCL (ref 140–450)
POTASSIUM SERPL-SCNC: 3.7 MMOL/L (ref 3.4–5.1)
RBC # BLD: 2.72 MILLION/MCL (ref 4–5.2)
SODIUM SERPL-SCNC: 137 MMOL/L (ref 135–145)
WBC # BLD: 7 THOUSAND/MCL (ref 4.2–11)

## 2019-02-15 LAB
GLUCOSE BLDC GLUCOMTR-MCNC: 124 MG/DL (ref 65–99)
GLUCOSE BLDC GLUCOMTR-MCNC: 136 MG/DL (ref 65–99)

## 2022-07-30 ENCOUNTER — NURSE ONLY (OUTPATIENT)
Dept: ELECTROPHYSIOLOGY | Facility: HOSPITAL | Age: 62
DRG: 917 | End: 2022-07-30
Attending: Other
Payer: MEDICAID

## 2022-07-30 ENCOUNTER — HOSPITAL ENCOUNTER (INPATIENT)
Facility: HOSPITAL | Age: 62
LOS: 4 days | Discharge: HOME | End: 2022-08-03
Attending: EMERGENCY MEDICINE | Admitting: HOSPITALIST
Payer: MEDICAID

## 2022-07-30 ENCOUNTER — APPOINTMENT (OUTPATIENT)
Dept: CT IMAGING | Facility: HOSPITAL | Age: 62
End: 2022-07-30
Attending: EMERGENCY MEDICINE
Payer: MEDICAID

## 2022-07-30 ENCOUNTER — NURSE ONLY (OUTPATIENT)
Dept: ELECTROPHYSIOLOGY | Facility: HOSPITAL | Age: 62
End: 2022-07-30
Attending: Other
Payer: MEDICAID

## 2022-07-30 ENCOUNTER — APPOINTMENT (OUTPATIENT)
Dept: GENERAL RADIOLOGY | Facility: HOSPITAL | Age: 62
End: 2022-07-30
Attending: EMERGENCY MEDICINE
Payer: MEDICAID

## 2022-07-30 DIAGNOSIS — R57.9 SHOCK (HCC): ICD-10-CM

## 2022-07-30 DIAGNOSIS — R41.0 DELIRIUM: Primary | ICD-10-CM

## 2022-07-30 PROBLEM — R53.83 LETHARGY: Status: ACTIVE | Noted: 2022-07-30

## 2022-07-30 LAB
ALBUMIN SERPL-MCNC: 3.1 G/DL (ref 3.4–5)
ALBUMIN/GLOB SERPL: 0.8 {RATIO} (ref 1–2)
ALP LIVER SERPL-CCNC: 66 U/L
ALT SERPL-CCNC: 18 U/L
AMMONIA PLAS-MCNC: 13 UMOL/L (ref 11–32)
AMPHET UR QL SCN: NEGATIVE
ANION GAP SERPL CALC-SCNC: 0 MMOL/L (ref 0–18)
ANION GAP SERPL CALC-SCNC: 3 MMOL/L (ref 0–18)
AST SERPL-CCNC: 14 U/L (ref 15–37)
BASOPHILS # BLD AUTO: 0.03 X10(3) UL (ref 0–0.2)
BASOPHILS NFR BLD AUTO: 0.4 %
BENZODIAZ UR QL SCN: NEGATIVE
BILIRUB SERPL-MCNC: 0.1 MG/DL (ref 0.1–2)
BILIRUB UR QL STRIP.AUTO: NEGATIVE
BUN BLD-MCNC: 38 MG/DL (ref 7–18)
BUN BLD-MCNC: 46 MG/DL (ref 7–18)
CALCIUM BLD-MCNC: 10.3 MG/DL (ref 8.5–10.1)
CALCIUM BLD-MCNC: 9.9 MG/DL (ref 8.5–10.1)
CANNABINOIDS UR QL SCN: NEGATIVE
CHLORIDE SERPL-SCNC: 111 MMOL/L (ref 98–112)
CHLORIDE SERPL-SCNC: 116 MMOL/L (ref 98–112)
CLARITY UR REFRACT.AUTO: CLEAR
CO2 SERPL-SCNC: 25 MMOL/L (ref 21–32)
CO2 SERPL-SCNC: 26 MMOL/L (ref 21–32)
COCAINE UR QL: NEGATIVE
COLOR UR AUTO: YELLOW
CREAT BLD-MCNC: 1.85 MG/DL
CREAT BLD-MCNC: 2.61 MG/DL
CREAT UR-SCNC: 116 MG/DL
EOSINOPHIL # BLD AUTO: 0.07 X10(3) UL (ref 0–0.7)
EOSINOPHIL NFR BLD AUTO: 0.9 %
ERYTHROCYTE [DISTWIDTH] IN BLOOD BY AUTOMATED COUNT: 14 %
FLUAV + FLUBV RNA SPEC NAA+PROBE: NEGATIVE
FLUAV + FLUBV RNA SPEC NAA+PROBE: NEGATIVE
GLOBULIN PLAS-MCNC: 3.9 G/DL (ref 2.8–4.4)
GLUCOSE BLD-MCNC: 142 MG/DL (ref 70–99)
GLUCOSE BLD-MCNC: 161 MG/DL (ref 70–99)
GLUCOSE BLD-MCNC: 173 MG/DL (ref 70–99)
GLUCOSE BLD-MCNC: 246 MG/DL (ref 70–99)
GLUCOSE UR STRIP.AUTO-MCNC: 250 MG/DL
HCT VFR BLD AUTO: 31.1 %
HGB BLD-MCNC: 9.7 G/DL
IMM GRANULOCYTES # BLD AUTO: 0.05 X10(3) UL (ref 0–1)
IMM GRANULOCYTES NFR BLD: 0.7 %
INR BLD: 0.95 (ref 0.85–1.16)
KETONES UR STRIP.AUTO-MCNC: NEGATIVE MG/DL
LACTATE SERPL-SCNC: 1 MMOL/L (ref 0.4–2)
LEUKOCYTE ESTERASE UR QL STRIP.AUTO: NEGATIVE
LIPASE SERPL-CCNC: 385 U/L (ref 73–393)
LYMPHOCYTES # BLD AUTO: 1.17 X10(3) UL (ref 1–4)
LYMPHOCYTES NFR BLD AUTO: 15.7 %
MCH RBC QN AUTO: 30.1 PG (ref 26–34)
MCHC RBC AUTO-ENTMCNC: 31.2 G/DL (ref 31–37)
MCV RBC AUTO: 96.6 FL
MDMA UR QL SCN: NEGATIVE
MONOCYTES # BLD AUTO: 0.8 X10(3) UL (ref 0.1–1)
MONOCYTES NFR BLD AUTO: 10.7 %
NEUTROPHILS # BLD AUTO: 5.35 X10 (3) UL (ref 1.5–7.7)
NEUTROPHILS # BLD AUTO: 5.35 X10(3) UL (ref 1.5–7.7)
NEUTROPHILS NFR BLD AUTO: 71.6 %
NITRITE UR QL STRIP.AUTO: NEGATIVE
NT-PROBNP SERPL-MCNC: 71 PG/ML (ref ?–125)
OPIATES UR QL SCN: NEGATIVE
OSMOLALITY SERPL CALC.SUM OF ELEC: 307 MOSM/KG (ref 275–295)
OSMOLALITY SERPL CALC.SUM OF ELEC: 308 MOSM/KG (ref 275–295)
OXYCODONE UR QL SCN: NEGATIVE
PH UR STRIP.AUTO: 5.5 [PH] (ref 5–8)
PLATELET # BLD AUTO: 190 10(3)UL (ref 150–450)
POTASSIUM SERPL-SCNC: 4.7 MMOL/L (ref 3.5–5.1)
POTASSIUM SERPL-SCNC: 6.5 MMOL/L (ref 3.5–5.1)
PROCALCITONIN SERPL-MCNC: 0.06 NG/ML (ref ?–0.16)
PROT SERPL-MCNC: 7 G/DL (ref 6.4–8.2)
PROT UR STRIP.AUTO-MCNC: NEGATIVE MG/DL
PROTHROMBIN TIME: 12.6 SECONDS (ref 11.6–14.8)
RBC # BLD AUTO: 3.22 X10(6)UL
RBC UR QL AUTO: NEGATIVE
RSV RNA SPEC NAA+PROBE: NEGATIVE
SARS-COV-2 RNA RESP QL NAA+PROBE: NOT DETECTED
SARS-COV-2 RNA RESP QL NAA+PROBE: NOT DETECTED
SODIUM SERPL-SCNC: 139 MMOL/L (ref 136–145)
SODIUM SERPL-SCNC: 142 MMOL/L (ref 136–145)
SP GR UR STRIP.AUTO: 1.01 (ref 1–1.03)
TSI SER-ACNC: 1.04 MIU/ML (ref 0.36–3.74)
UROBILINOGEN UR STRIP.AUTO-MCNC: 0.2 MG/DL
WBC # BLD AUTO: 7.5 X10(3) UL (ref 4–11)

## 2022-07-30 PROCEDURE — 99223 1ST HOSP IP/OBS HIGH 75: CPT | Performed by: HOSPITALIST

## 2022-07-30 PROCEDURE — 71045 X-RAY EXAM CHEST 1 VIEW: CPT | Performed by: EMERGENCY MEDICINE

## 2022-07-30 PROCEDURE — 99254 IP/OBS CNSLTJ NEW/EST MOD 60: CPT | Performed by: OTHER

## 2022-07-30 PROCEDURE — 70450 CT HEAD/BRAIN W/O DYE: CPT | Performed by: EMERGENCY MEDICINE

## 2022-07-30 RX ORDER — LORAZEPAM 2 MG/ML
1 INJECTION INTRAMUSCULAR ONCE
Status: DISCONTINUED | OUTPATIENT
Start: 2022-07-30 | End: 2022-07-30

## 2022-07-30 RX ORDER — FLUTICASONE PROPIONATE 220 UG/1
2 AEROSOL, METERED RESPIRATORY (INHALATION) 2 TIMES DAILY
COMMUNITY

## 2022-07-30 RX ORDER — ALENDRONATE SODIUM 70 MG/1
70 TABLET ORAL
COMMUNITY

## 2022-07-30 RX ORDER — FAMOTIDINE 40 MG/1
40 TABLET, FILM COATED ORAL DAILY
COMMUNITY

## 2022-07-30 RX ORDER — PANTOPRAZOLE SODIUM 40 MG/1
40 TABLET, DELAYED RELEASE ORAL
Status: DISCONTINUED | OUTPATIENT
Start: 2022-07-31 | End: 2022-08-03

## 2022-07-30 RX ORDER — PREDNISONE 1 MG/1
15 TABLET ORAL
COMMUNITY

## 2022-07-30 RX ORDER — ATORVASTATIN CALCIUM 40 MG/1
40 TABLET, FILM COATED ORAL NIGHTLY
Status: DISCONTINUED | OUTPATIENT
Start: 2022-07-30 | End: 2022-08-03

## 2022-07-30 RX ORDER — SODIUM CHLORIDE 9 MG/ML
1000 INJECTION, SOLUTION INTRAVENOUS ONCE
Status: COMPLETED | OUTPATIENT
Start: 2022-07-30 | End: 2022-07-30

## 2022-07-30 RX ORDER — LORAZEPAM 2 MG/ML
1 INJECTION INTRAMUSCULAR ONCE
Status: COMPLETED | OUTPATIENT
Start: 2022-07-30 | End: 2022-07-30

## 2022-07-30 RX ORDER — PYRIDOSTIGMINE BROMIDE 60 MG/1
90 TABLET ORAL 3 TIMES DAILY
COMMUNITY

## 2022-07-30 RX ORDER — ACETAMINOPHEN 325 MG/1
650 TABLET ORAL EVERY 6 HOURS PRN
COMMUNITY

## 2022-07-30 RX ORDER — CITALOPRAM 40 MG/1
40 TABLET ORAL DAILY
COMMUNITY

## 2022-07-30 RX ORDER — INSULIN GLARGINE 100 [IU]/ML
14 INJECTION, SOLUTION SUBCUTANEOUS NIGHTLY
Status: ON HOLD | COMMUNITY
End: 2022-08-03

## 2022-07-30 RX ORDER — BUSPIRONE HYDROCHLORIDE 10 MG/1
40 TABLET ORAL 3 TIMES DAILY
COMMUNITY
End: 2022-08-03

## 2022-07-30 RX ORDER — FLUTICASONE FUROATE AND VILANTEROL 200; 25 UG/1; UG/1
1 POWDER RESPIRATORY (INHALATION) DAILY
Status: DISCONTINUED | OUTPATIENT
Start: 2022-07-31 | End: 2022-08-03

## 2022-07-30 RX ORDER — LORAZEPAM 0.5 MG/1
0.5 TABLET ORAL EVERY 4 HOURS PRN
Status: ON HOLD | COMMUNITY
End: 2022-08-02

## 2022-07-30 RX ORDER — TRAZODONE HYDROCHLORIDE 150 MG/1
150 TABLET ORAL NIGHTLY
COMMUNITY
End: 2022-08-03

## 2022-07-30 RX ORDER — TRAZODONE HYDROCHLORIDE 50 MG/1
150 TABLET ORAL NIGHTLY
Status: DISCONTINUED | OUTPATIENT
Start: 2022-07-30 | End: 2022-07-30

## 2022-07-30 RX ORDER — PANTOPRAZOLE SODIUM 40 MG/1
40 TABLET, DELAYED RELEASE ORAL
COMMUNITY

## 2022-07-30 RX ORDER — ACETAMINOPHEN, ASPIRIN AND CAFFEINE 250; 250; 65 MG/1; MG/1; MG/1
1 TABLET, FILM COATED ORAL EVERY 6 HOURS PRN
COMMUNITY

## 2022-07-30 RX ORDER — MECLIZINE HCL 12.5 MG/1
12.5 TABLET ORAL 3 TIMES DAILY PRN
COMMUNITY

## 2022-07-30 RX ORDER — DEXTROSE MONOHYDRATE 25 G/50ML
50 INJECTION, SOLUTION INTRAVENOUS ONCE
Status: COMPLETED | OUTPATIENT
Start: 2022-07-30 | End: 2022-07-30

## 2022-07-30 RX ORDER — DOXEPIN HYDROCHLORIDE 10 MG/1
30 CAPSULE ORAL NIGHTLY
COMMUNITY
End: 2022-08-03

## 2022-07-30 RX ORDER — SODIUM CHLORIDE, SODIUM LACTATE, POTASSIUM CHLORIDE, CALCIUM CHLORIDE 600; 310; 30; 20 MG/100ML; MG/100ML; MG/100ML; MG/100ML
INJECTION, SOLUTION INTRAVENOUS CONTINUOUS
Status: DISCONTINUED | OUTPATIENT
Start: 2022-07-30 | End: 2022-07-31

## 2022-07-30 RX ORDER — ACETAMINOPHEN 325 MG/1
650 TABLET ORAL EVERY 6 HOURS PRN
Status: DISCONTINUED | OUTPATIENT
Start: 2022-07-30 | End: 2022-08-03

## 2022-07-30 RX ORDER — LATANOPROST 50 UG/ML
1 SOLUTION/ DROPS OPHTHALMIC NIGHTLY
Status: DISCONTINUED | OUTPATIENT
Start: 2022-07-30 | End: 2022-08-03

## 2022-07-30 RX ORDER — PYRIDOSTIGMINE BROMIDE 180 MG/1
180 TABLET, EXTENDED RELEASE ORAL NIGHTLY
COMMUNITY

## 2022-07-30 RX ORDER — LIDOCAINE 50 MG/G
OINTMENT TOPICAL AS NEEDED
COMMUNITY

## 2022-07-30 RX ORDER — ONDANSETRON 4 MG/1
4 TABLET, FILM COATED ORAL EVERY 8 HOURS PRN
COMMUNITY

## 2022-07-30 RX ORDER — DEXMEDETOMIDINE HYDROCHLORIDE 4 UG/ML
INJECTION, SOLUTION INTRAVENOUS
Status: COMPLETED
Start: 2022-07-30 | End: 2022-07-30

## 2022-07-30 RX ORDER — ATORVASTATIN CALCIUM 40 MG/1
40 TABLET, FILM COATED ORAL NIGHTLY
COMMUNITY

## 2022-07-30 RX ORDER — BACLOFEN 20 MG/1
20 TABLET ORAL 3 TIMES DAILY
COMMUNITY

## 2022-07-30 RX ORDER — PREGABALIN 150 MG/1
150 CAPSULE ORAL 2 TIMES DAILY
COMMUNITY

## 2022-07-30 RX ORDER — LORAZEPAM 2 MG/ML
INJECTION INTRAMUSCULAR
Status: DISPENSED
Start: 2022-07-30 | End: 2022-07-30

## 2022-07-30 RX ORDER — FLUTICASONE PROPIONATE AND SALMETEROL 100; 50 UG/1; UG/1
1 POWDER RESPIRATORY (INHALATION) 2 TIMES DAILY
COMMUNITY

## 2022-07-30 RX ORDER — CALCIUM GLUCONATE 10 MG/ML
1 INJECTION, SOLUTION INTRAVENOUS ONCE
Status: COMPLETED | OUTPATIENT
Start: 2022-07-30 | End: 2022-07-30

## 2022-07-30 RX ORDER — BUSPIRONE HYDROCHLORIDE 10 MG/1
20 TABLET ORAL 3 TIMES DAILY
Status: DISCONTINUED | OUTPATIENT
Start: 2022-07-30 | End: 2022-08-03

## 2022-07-30 RX ORDER — FOLIC ACID 1 MG/1
1 TABLET ORAL DAILY
COMMUNITY

## 2022-07-30 RX ORDER — ESCITALOPRAM OXALATE 20 MG/1
20 TABLET ORAL DAILY
Status: DISCONTINUED | OUTPATIENT
Start: 2022-07-30 | End: 2022-08-03

## 2022-07-30 RX ORDER — AZITHROMYCIN 250 MG/1
500 TABLET, FILM COATED ORAL DAILY
COMMUNITY

## 2022-07-30 RX ORDER — LISINOPRIL 20 MG/1
20 TABLET ORAL DAILY
COMMUNITY

## 2022-07-30 RX ORDER — MELATONIN
3 NIGHTLY PRN
Status: DISCONTINUED | OUTPATIENT
Start: 2022-07-30 | End: 2022-07-30

## 2022-07-30 RX ORDER — HYDRALAZINE HYDROCHLORIDE 50 MG/1
50 TABLET, FILM COATED ORAL 2 TIMES DAILY
Status: ON HOLD | COMMUNITY
End: 2022-08-03

## 2022-07-30 RX ORDER — PANTOPRAZOLE SODIUM 40 MG/1
40 TABLET, DELAYED RELEASE ORAL
Status: DISCONTINUED | OUTPATIENT
Start: 2022-07-31 | End: 2022-07-30

## 2022-07-30 RX ORDER — DEXMEDETOMIDINE HYDROCHLORIDE 4 UG/ML
INJECTION, SOLUTION INTRAVENOUS CONTINUOUS
Status: DISCONTINUED | OUTPATIENT
Start: 2022-07-30 | End: 2022-08-01 | Stop reason: HOSPADM

## 2022-07-30 RX ORDER — FUROSEMIDE 20 MG/1
20 TABLET ORAL
Status: ON HOLD | COMMUNITY
End: 2022-08-02

## 2022-07-30 RX ORDER — FUROSEMIDE 20 MG/1
20 TABLET ORAL DAILY
Status: DISCONTINUED | OUTPATIENT
Start: 2022-07-31 | End: 2022-08-01

## 2022-07-30 RX ORDER — HALOPERIDOL 5 MG/ML
1 INJECTION INTRAMUSCULAR ONCE
Status: COMPLETED | OUTPATIENT
Start: 2022-07-30 | End: 2022-07-30

## 2022-07-30 RX ORDER — PREGABALIN 75 MG/1
150 CAPSULE ORAL 2 TIMES DAILY
Status: DISCONTINUED | OUTPATIENT
Start: 2022-07-30 | End: 2022-08-03

## 2022-07-30 RX ORDER — ESCITALOPRAM OXALATE 20 MG/1
20 TABLET ORAL DAILY
COMMUNITY

## 2022-07-30 RX ORDER — LOPERAMIDE HYDROCHLORIDE 2 MG/1
2 CAPSULE ORAL 4 TIMES DAILY PRN
COMMUNITY

## 2022-07-30 RX ORDER — SODIUM CHLORIDE 9 MG/ML
INJECTION, SOLUTION INTRAVENOUS CONTINUOUS
Status: DISCONTINUED | OUTPATIENT
Start: 2022-07-30 | End: 2022-07-30

## 2022-07-30 RX ORDER — MELATONIN
325
COMMUNITY

## 2022-07-30 NOTE — ED QUICK NOTES
Spoke with daughter, Channel    Low BP is normal during crisis   A&Ox1 is baseline   Fistula for plasma phoresis    Hx of lung cancer, lobectomy - 2017 Patient has not overdosed. MDs ordering meds have never \"cross referenced\" the meds she's on for compatibility. \"     Patient has had neck fx in the past. Daughter recommends leaving her in soft restraints     Channel can be reached at: [phone]

## 2022-07-30 NOTE — ED INITIAL ASSESSMENT (HPI)
Lupus flare up, A&O to only verbal stim. Laying on ground with family. Intially combative. AMS thrhoguht call, pinpiont pupils, narcan given 2mg IN. Hypotensive.  45 end-tidal.

## 2022-07-30 NOTE — PLAN OF CARE
Received patient from the ED on levophed gtt, restless, yelling out, unable to follow any commands, attempting to sit up and get out of bed. Patient in bilateral wrist restraints and posey vest. Levophed gtt stopped upon arrival to unit. Patient still extremely restless, thrashing legs and continuing to move legs to edge of bed. Bilateral soft ankle restraints applied. EEG ordered per neurology. 1x haldol IVP given for EEG. Low dose precedex gtt initiated. Daughter Josephine given update on patient status and plan of care.

## 2022-07-30 NOTE — ED QUICK NOTES
D/T patient altered mental status, and attempted to jump out of bed/hitting staff, patient was placed in POSEY vest for her own safety.

## 2022-07-30 NOTE — RESPIRATORY THERAPY NOTE
RT called to intubate pt who was \"unresponsive\" with snoring respirations. Dr Bradly Gilmore tried to intubate pt and she sat up stating that \"it hurts real bad. \" Pt currently in no respiratory distress. Will continue to monitor.

## 2022-07-30 NOTE — ED QUICK NOTES
Patient continues to vocalize unintelligibly to staff; moving around to bed. This RN and Annita Scrape attempting to calm patient.

## 2022-07-30 NOTE — ED QUICK NOTES
Daughter is POA    Channel - [phone]    CORRECTION: daughter is not POA. She is the patient's advocate for when patient is in myasthenia gravis crisis.

## 2022-07-30 NOTE — ED QUICK NOTES
During intubation patient woke up and started grabbing and kicking at staff, attempting to climb out of bed.

## 2022-07-31 LAB
ANION GAP SERPL CALC-SCNC: 2 MMOL/L (ref 0–18)
ATRIAL RATE: 65 BPM
BASOPHILS # BLD AUTO: 0.02 X10(3) UL (ref 0–0.2)
BASOPHILS NFR BLD AUTO: 0.3 %
BUN BLD-MCNC: 34 MG/DL (ref 7–18)
CALCIUM BLD-MCNC: 10.5 MG/DL (ref 8.5–10.1)
CHLORIDE SERPL-SCNC: 119 MMOL/L (ref 98–112)
CO2 SERPL-SCNC: 23 MMOL/L (ref 21–32)
CREAT BLD-MCNC: 1.44 MG/DL
EOSINOPHIL # BLD AUTO: 0 X10(3) UL (ref 0–0.7)
EOSINOPHIL NFR BLD AUTO: 0 %
ERYTHROCYTE [DISTWIDTH] IN BLOOD BY AUTOMATED COUNT: 14 %
GLUCOSE BLD-MCNC: 105 MG/DL (ref 70–99)
GLUCOSE BLD-MCNC: 146 MG/DL (ref 70–99)
GLUCOSE BLD-MCNC: 182 MG/DL (ref 70–99)
GLUCOSE BLD-MCNC: 191 MG/DL (ref 70–99)
GLUCOSE BLD-MCNC: 266 MG/DL (ref 70–99)
GLUCOSE BLD-MCNC: 92 MG/DL (ref 70–99)
HCT VFR BLD AUTO: 35.8 %
HGB BLD-MCNC: 10.6 G/DL
IMM GRANULOCYTES # BLD AUTO: 0.04 X10(3) UL (ref 0–1)
IMM GRANULOCYTES NFR BLD: 0.5 %
LYMPHOCYTES # BLD AUTO: 0.51 X10(3) UL (ref 1–4)
LYMPHOCYTES NFR BLD AUTO: 6.9 %
MAGNESIUM SERPL-MCNC: 2.2 MG/DL (ref 1.6–2.6)
MCH RBC QN AUTO: 29.2 PG (ref 26–34)
MCHC RBC AUTO-ENTMCNC: 29.6 G/DL (ref 31–37)
MCV RBC AUTO: 98.6 FL
MONOCYTES # BLD AUTO: 0.18 X10(3) UL (ref 0.1–1)
MONOCYTES NFR BLD AUTO: 2.4 %
NEUTROPHILS # BLD AUTO: 6.62 X10 (3) UL (ref 1.5–7.7)
NEUTROPHILS # BLD AUTO: 6.62 X10(3) UL (ref 1.5–7.7)
NEUTROPHILS NFR BLD AUTO: 89.9 %
OSMOLALITY SERPL CALC.SUM OF ELEC: 305 MOSM/KG (ref 275–295)
P AXIS: 52 DEGREES
P-R INTERVAL: 152 MS
PLATELET # BLD AUTO: 187 10(3)UL (ref 150–450)
POTASSIUM SERPL-SCNC: 5.9 MMOL/L (ref 3.5–5.1)
Q-T INTERVAL: 422 MS
QRS DURATION: 86 MS
QTC CALCULATION (BEZET): 438 MS
R AXIS: 67 DEGREES
RBC # BLD AUTO: 3.63 X10(6)UL
SODIUM SERPL-SCNC: 144 MMOL/L (ref 136–145)
T AXIS: 72 DEGREES
VENTRICULAR RATE: 65 BPM
WBC # BLD AUTO: 7.4 X10(3) UL (ref 4–11)

## 2022-07-31 PROCEDURE — 99233 SBSQ HOSP IP/OBS HIGH 50: CPT | Performed by: HOSPITALIST

## 2022-07-31 PROCEDURE — 5A09357 ASSISTANCE WITH RESPIRATORY VENTILATION, LESS THAN 24 CONSECUTIVE HOURS, CONTINUOUS POSITIVE AIRWAY PRESSURE: ICD-10-PCS | Performed by: HOSPITALIST

## 2022-07-31 PROCEDURE — 99233 SBSQ HOSP IP/OBS HIGH 50: CPT | Performed by: OTHER

## 2022-07-31 RX ORDER — HEPARIN SODIUM 5000 [USP'U]/ML
5000 INJECTION, SOLUTION INTRAVENOUS; SUBCUTANEOUS EVERY 8 HOURS SCHEDULED
Status: DISCONTINUED | OUTPATIENT
Start: 2022-07-31 | End: 2022-08-03

## 2022-07-31 RX ORDER — BENZONATATE 100 MG/1
100 CAPSULE ORAL 3 TIMES DAILY PRN
Status: DISCONTINUED | OUTPATIENT
Start: 2022-07-31 | End: 2022-08-03

## 2022-07-31 RX ORDER — HYDRALAZINE HYDROCHLORIDE 25 MG/1
25 TABLET, FILM COATED ORAL EVERY 8 HOURS SCHEDULED
Status: DISCONTINUED | OUTPATIENT
Start: 2022-07-31 | End: 2022-08-01

## 2022-07-31 RX ORDER — SODIUM POLYSTYRENE SULFONATE 4.1 MEQ/G
15 POWDER, FOR SUSPENSION ORAL; RECTAL ONCE
Status: DISCONTINUED | OUTPATIENT
Start: 2022-07-31 | End: 2022-07-31

## 2022-07-31 RX ORDER — SODIUM POLYSTYRENE SULFONATE 4.1 MEQ/G
15 POWDER, FOR SUSPENSION ORAL; RECTAL ONCE
Status: COMPLETED | OUTPATIENT
Start: 2022-07-31 | End: 2022-07-31

## 2022-07-31 RX ORDER — QUETIAPINE FUMARATE 200 MG/1
200 TABLET, FILM COATED ORAL NIGHTLY
Status: ON HOLD | COMMUNITY
End: 2022-08-02

## 2022-07-31 RX ORDER — LISINOPRIL 10 MG/1
20 TABLET ORAL DAILY
Status: DISCONTINUED | OUTPATIENT
Start: 2022-08-01 | End: 2022-08-03

## 2022-07-31 NOTE — PROGRESS NOTES
[facility]  7/31/2022    3:06 PM      Addendum  I was informed that the patient is even more talkative at this point and is moving about and would not be able to hold still for an MRI unless really sedated or put under anesthesia,  Given the improvement I am now less concerned about a major stroke.   Will put a hold on this at this time,  The risk of regression with sedation outweighs whatever information we get with MRI especially now that she is more awake    Dr. Devi Scot

## 2022-07-31 NOTE — PLAN OF CARE
Received patient this morning awake and alert on low dose precedex gtt. Patient able to tell me her name and attempt to tell me the year but would repeat the same thing over and at times appeared to have some expressive aphasia. Over the course of the day, patient's mentation improved significantly and was able to carry on a conversation and ask questions. All restraints removed and precedex gtt stopped. Patient up to chair, out of bed and up to bathroom almost anxious/restless at times. Dr Vernon Melissa updated on improvement in patient mentation and MRI cancelled. Sitter at bedside for patient safety, due to patient frequently wanting to get up, move around or yell out for someone to come reposition her or help her with something in the room. Much more appropriate and calm with sitter at bedside. Patient able to tolerate diet, speech eval completed. Straight cath performed again late morning. This was reported to Dr Layton Hashimoto. Garcia catheter inserted per order. Patient updated on plan of care.

## 2022-07-31 NOTE — PROCEDURES
[facility] in [place]  in affiliation with [facility]  [address]  [place], [address] [place] [address]  [phone]  Fax [phone]      Nesha Reyes  8/15/1960    Date of testin2022  Ordering provider: Dr. Arnie Viera      Reason for testing:  Agitated delirium           ELECTROENCEPHALOGRAPHY     Using 10-20 International System digital recording: referential and bipolar montages were used for a routine EEG that was performed in the ICU      The background is disorganized diffuse slowing of 7 Hz theta with intermixed 6 Hz slowing. Frequent intermitted spikes noted that were separate from the vertex sharps observed. These other spike phase reverses over the C3 and P3 derivation (Left centro-parietal area). IMPRESSION: Abnormal EEG due to diffuse slowing but the presence of spikes  localized from the left central and parietal region indicates focal cerebral dysfunction and also probability of lowered seizure threshold. The other generalized sharps that phase reverses in vertex could possibly be just sleep. The above findings needs correlation with appropriate radiographic studies.           Vernon Vázquez MD  Vascular & General Neurology  Director, Multiple Sclerosis Program  [facility]  2022, Time completed 8:55 PM

## 2022-07-31 NOTE — OCCUPATIONAL THERAPY NOTE
OT orders received, chart reviewed and case discussed with RN. Pt's mental status is improving, but not yet appropriate for participation in skilled therapy. OT will follow up as appropriate and as able.

## 2022-08-01 ENCOUNTER — APPOINTMENT (OUTPATIENT)
Dept: GENERAL RADIOLOGY | Facility: HOSPITAL | Age: 62
End: 2022-08-01
Attending: INTERNAL MEDICINE
Payer: MEDICAID

## 2022-08-01 LAB
ANION GAP SERPL CALC-SCNC: 6 MMOL/L (ref 0–18)
BASOPHILS # BLD AUTO: 0.05 X10(3) UL (ref 0–0.2)
BASOPHILS NFR BLD AUTO: 0.6 %
BUN BLD-MCNC: 31 MG/DL (ref 7–18)
CALCIUM BLD-MCNC: 10.5 MG/DL (ref 8.5–10.1)
CHLORIDE SERPL-SCNC: 112 MMOL/L (ref 98–112)
CO2 SERPL-SCNC: 24 MMOL/L (ref 21–32)
CREAT BLD-MCNC: 1.34 MG/DL
EOSINOPHIL # BLD AUTO: 0.05 X10(3) UL (ref 0–0.7)
EOSINOPHIL NFR BLD AUTO: 0.6 %
ERYTHROCYTE [DISTWIDTH] IN BLOOD BY AUTOMATED COUNT: 13.6 %
GLUCOSE BLD-MCNC: 111 MG/DL (ref 70–99)
GLUCOSE BLD-MCNC: 153 MG/DL (ref 70–99)
GLUCOSE BLD-MCNC: 202 MG/DL (ref 70–99)
GLUCOSE BLD-MCNC: 231 MG/DL (ref 70–99)
GLUCOSE BLD-MCNC: 76 MG/DL (ref 70–99)
HCT VFR BLD AUTO: 33.2 %
HGB BLD-MCNC: 9.8 G/DL
IMM GRANULOCYTES # BLD AUTO: 0.04 X10(3) UL (ref 0–1)
IMM GRANULOCYTES NFR BLD: 0.5 %
LYMPHOCYTES # BLD AUTO: 1.67 X10(3) UL (ref 1–4)
LYMPHOCYTES NFR BLD AUTO: 20 %
MAGNESIUM SERPL-MCNC: 1.7 MG/DL (ref 1.6–2.6)
MCH RBC QN AUTO: 29.6 PG (ref 26–34)
MCHC RBC AUTO-ENTMCNC: 29.5 G/DL (ref 31–37)
MCV RBC AUTO: 100.3 FL
MONOCYTES # BLD AUTO: 0.93 X10(3) UL (ref 0.1–1)
MONOCYTES NFR BLD AUTO: 11.1 %
NEUTROPHILS # BLD AUTO: 5.63 X10 (3) UL (ref 1.5–7.7)
NEUTROPHILS # BLD AUTO: 5.63 X10(3) UL (ref 1.5–7.7)
NEUTROPHILS NFR BLD AUTO: 67.2 %
OSMOLALITY SERPL CALC.SUM OF ELEC: 299 MOSM/KG (ref 275–295)
PHOSPHATE SERPL-MCNC: 1.6 MG/DL (ref 2.5–4.9)
PLATELET # BLD AUTO: 174 10(3)UL (ref 150–450)
POTASSIUM SERPL-SCNC: 3.8 MMOL/L (ref 3.5–5.1)
RBC # BLD AUTO: 3.31 X10(6)UL
SODIUM SERPL-SCNC: 142 MMOL/L (ref 136–145)
WBC # BLD AUTO: 8.4 X10(3) UL (ref 4–11)

## 2022-08-01 PROCEDURE — 99233 SBSQ HOSP IP/OBS HIGH 50: CPT | Performed by: OTHER

## 2022-08-01 PROCEDURE — 99233 SBSQ HOSP IP/OBS HIGH 50: CPT | Performed by: HOSPITALIST

## 2022-08-01 PROCEDURE — 71045 X-RAY EXAM CHEST 1 VIEW: CPT | Performed by: INTERNAL MEDICINE

## 2022-08-01 PROCEDURE — 90792 PSYCH DIAG EVAL W/MED SRVCS: CPT | Performed by: OTHER

## 2022-08-01 RX ORDER — ECHINACEA PURPUREA EXTRACT 125 MG
1 TABLET ORAL
Status: DISCONTINUED | OUTPATIENT
Start: 2022-08-01 | End: 2022-08-03

## 2022-08-01 RX ORDER — PYRIDOSTIGMINE BROMIDE 60 MG/1
90 TABLET ORAL EVERY 8 HOURS SCHEDULED
Status: DISCONTINUED | OUTPATIENT
Start: 2022-08-01 | End: 2022-08-03

## 2022-08-01 RX ORDER — BACLOFEN 10 MG/1
10 TABLET ORAL 3 TIMES DAILY
Status: DISCONTINUED | OUTPATIENT
Start: 2022-08-01 | End: 2022-08-03

## 2022-08-01 RX ORDER — QUETIAPINE FUMARATE 100 MG/1
300 TABLET, FILM COATED ORAL NIGHTLY
Status: DISCONTINUED | OUTPATIENT
Start: 2022-08-01 | End: 2022-08-03

## 2022-08-01 RX ORDER — FUROSEMIDE 20 MG/1
10 TABLET ORAL DAILY
Status: DISCONTINUED | OUTPATIENT
Start: 2022-08-02 | End: 2022-08-03

## 2022-08-01 RX ORDER — HYDRALAZINE HYDROCHLORIDE 50 MG/1
50 TABLET, FILM COATED ORAL 2 TIMES DAILY
Status: DISCONTINUED | OUTPATIENT
Start: 2022-08-01 | End: 2022-08-02

## 2022-08-01 RX ORDER — KETOROLAC TROMETHAMINE 30 MG/ML
30 INJECTION, SOLUTION INTRAMUSCULAR; INTRAVENOUS EVERY 6 HOURS PRN
Status: DISCONTINUED | OUTPATIENT
Start: 2022-08-01 | End: 2022-08-03

## 2022-08-01 RX ORDER — LORAZEPAM 2 MG/ML
0.5 INJECTION INTRAMUSCULAR ONCE
Status: COMPLETED | OUTPATIENT
Start: 2022-08-01 | End: 2022-08-01

## 2022-08-01 RX ORDER — MAGNESIUM OXIDE 400 MG/1
400 TABLET ORAL ONCE
Status: COMPLETED | OUTPATIENT
Start: 2022-08-01 | End: 2022-08-01

## 2022-08-01 RX ORDER — KETOROLAC TROMETHAMINE 30 MG/ML
15 INJECTION, SOLUTION INTRAMUSCULAR; INTRAVENOUS EVERY 6 HOURS PRN
Status: DISCONTINUED | OUTPATIENT
Start: 2022-08-01 | End: 2022-08-03

## 2022-08-01 RX ORDER — LORAZEPAM 0.5 MG/1
0.25 TABLET ORAL EVERY 6 HOURS PRN
Status: DISCONTINUED | OUTPATIENT
Start: 2022-08-01 | End: 2022-08-03

## 2022-08-01 NOTE — PROGRESS NOTES
PSYCH CONSULT    Date of Admission: 7/30/22  Date of Consult: 8/1/22  Reason for Consultation: Polypharmacy    Impression:  Psychiatric Diagnoses:  Acute delirium/encephalopathy likely due to polypharmacy (baclofen, lyrica, seroquel, xanax prn, ativan prn) and MARGARET on CKD. IMPROVING. Anxiety and depressive disorder unspecified. Rule Out Diagnoses:  Major depressive disorder. Generalized anxiety disorder. Somatic symptom disorder. Personality Traits:  OCPD. See her notebook of records. Pertinent Medical Diagnoses: MARGARET on CKD. Lupus, myasthenia gravis, COPD, LOW, DM, HTN, HLD. Recommendations:  1) DC 1:1 sitter. She did NOT attempt suicide or take extra meds per her report. 2) Restart Baclofen at lower dose of 10mg three times a day given her delirium. Being used for her muscle spasms. Defer to primary service on whether to increase back to her usual 20mg three times a day dosing. She remains on Lyrica 150mg twice a day for her chronic pain. 3) Continue Lexapro 20mg daily for anxiety/depression. She is usually on Lexapro 20mg and Celexa 40mg daily, which is the equivalent to Lexapro 40mg daily. She said she is prescribed the meds separately to get them covered by insurance. This is an unusually high dose of SSRI. Sometimes high doses are used for OCD symptoms. 4) Continue Buspar 20mg three times a day for anxiety. 5) Continue Seroquel 300mg nightly for insomnia and mood irritability. Her psychiatrist DC'ed Trazodone and titrated up the Seroquel to 300mg nightly last month. 6) Continue Ativan 0.25mg po every 6 hrs PRN anxiety in the hospital. Would not give her any benzos prn when discharged. At home, she is on Xanax 0.25mg twice a day PRN and Ativan 0.5mg daily PRN. She is getting her meds from different providers which increases her risk for polypharmacy. She says she rarely takes ativan prn or xanax prn-- I think this is true since her UDS was negative for benzos.      7) I will send my note to her outpatient psychiatrist. More communication between providers about the meds prescribed is beneficial. I am not going to change the dose of any the psych meds except for the lower dose of Lexapro at 20mg daily. Her outpatient psychiatrist can decide whether to go back to the higher dose (Celexa 40mg and Lexapro 20mg daily). Full note to follow.     Dr. Jess Guardado

## 2022-08-01 NOTE — PLAN OF CARE
Received to unit from ICU  A & O x 4  Anxious,restless,  hyper-verbal, complaints of pain, MD paged. One time dose of ativan 0.5mg IV given per order. NSR on tele. On room air . Lung sounds diminished. Noted with a  dry,unproductive cough. Up to bathroom with 1-person SBA  Blood sugar stable. IV site to right external jugular vein and RFA both saline locked.

## 2022-08-01 NOTE — PROGRESS NOTES
07/31/22 2220   Clinical Encounter Type   Visited With Patient   Routine Visit Introduction   Continue Visiting No   Synagogue Encounters   Synagogue Needs Bible  (Bible provided, as requested by the pt)   Taxonomy   Intended Effects Demonstrate caring and concern   Methods Offer spiritual/Druze support   Interventions Acknowledge current situation; Share words of hope and inspiration;Clare

## 2022-08-01 NOTE — DIETARY NOTE
[facility]    NUTRITION ASSESSMENT    Pt does not meet malnutrition criteria. NUTRITION INTERVENTION:    1. Meal and Snacks - monitor patient po intake. Encourage adequate po of appropriate diet. 2. Medical Food Supplements - Pt not interested at this time. Rationale/use for oral supplements discussed. PATIENT STATUS: 64 y/o female admitted on 7/30 with delirium. Currently AOx4, reports prior AMS d/t medications. Noted MST score of 2 for unobtainable information. Pt seen laying in bed with sitter at bedside. Reports she is 5'9\" with UBW of 170#. Reports a good appetite, eating 2 meals so far today. BMx2 today. Denies N/V/D or constipation. High blood glucose noted, pt on steroids. On a 60 g carb diet. SLP recommends soft to chew, thin liquid consistencies. RD to monitor PO and wt. PMH includes LOW on CPAP, Myasthenia gravis, Lupus, HL, Diverticulitis, COPD, depression, anxiety, CKD      ANTHROPOMETRICS:  Ht:  5' 9\"  Wt: 82.9 kg (182 lb 12.2 oz). BMI: 27  IBW: 66 kg        WEIGHT HISTORY: No sig wt changes  Patient Weight(s) for the past 336 hrs:   Weight   07/31/22 0432 82.9 kg (182 lb 12.2 oz)   07/30/22 1420 81.4 kg (179 lb 7.3 oz)       Wt Readings from Last 10 Encounters:  07/31/22 : 82.9 kg (182 lb 12.2 oz)  07/30/22 : 81.4 kg (179 lb 7.3 oz)       NUTRITION:  Diet: Orders Placed This Encounter      Carbohydrate controlled diet 1800 kcal/60 grams; Is Patient on Accuchecks? Yes     Oral Supplements: Pt not interested at this time    Percent Meals Eaten (last 3 days)     None        FOOD/NUTRITION RELATED HISTORY:   Appetite: Good  Intake: None documented. Pt reports eating 2xmeals today. Intake Meeting Needs: Yes  Food Allergies: No  Cultural/Ethnic/Rastafarian Preferences Addresses: Yes    GI SYSTEM REVIEW: WNL +BM 8/1    NUTRITION RELATED PHYSICAL FINDINGS:     1. Body Fat/Muscle Mass: well nourished      2.  Fluid Accumulation: none per RN documentation     NUTRITION PRESCRIPTION: 82.9 kg (7/31)  Calories: 0969-4876 calories/day (23-25 calories per kg)  Protein:  grams protein/day (1-1.2 grams protein per kg)  Fluid: ~1 ml/kcal or per MD discretion    NUTRITION DIAGNOSIS/PROBLEM:  Acute inadequate oral intake related to psychological causes as evidenced by documented/reported insufficient oral intake      MONITOR AND EVALUATE/NUTRITION GOALS:  1. PO intake of 75% of meals TID - New  2. Weight stable within 1 to 2 lbs during admission - New      MEDICATIONS:  Lasix, solu-cortef, Insulin, mag-ox, protonix, KPhos 15mmol     LABS:  POC Gluc 105-266, BUN:31, Cr:1.34, Ca:10.5, Phos:1.6, GFR:43    Pt is at Low nutrition risk    [facility]  Dietetic Intern    Nutrition Assessment completed by Dietetic Intern.   Reviewed and approved by:  Miriam Guzman MS, RD, LDN  Clinical Dietitian  Pager #: [phone]

## 2022-08-01 NOTE — PLAN OF CARE
Patient assumed at 1. Patient more alert and appropriate in comparison to last night. Precedex gtt of since yesterday late afternoon.   Patient stated

## 2022-08-01 NOTE — PLAN OF CARE
Pt received from previous RN, awake and alert oriented X4. Complaining of having not slept, requesting home meds for sleep. Explained we need to get that clarified from MD.  Impulsive and inpatient with activities but when explained need to slow down and do them safely cooperative. Reports back pain, responsive to heat pack. Ativan given as per ordered, pt states she needs higher dose. Pt to transfer to [facility], report called to [employer].  Pt notified of transfer and she notified Christiane Cruz

## 2022-08-02 ENCOUNTER — APPOINTMENT (OUTPATIENT)
Dept: CT IMAGING | Facility: HOSPITAL | Age: 62
End: 2022-08-02
Attending: HOSPITALIST
Payer: MEDICAID

## 2022-08-02 LAB
ALBUMIN SERPL-MCNC: 3 G/DL (ref 3.4–5)
ALBUMIN/GLOB SERPL: 0.8 {RATIO} (ref 1–2)
ALP LIVER SERPL-CCNC: 65 U/L
ALT SERPL-CCNC: 20 U/L
ANION GAP SERPL CALC-SCNC: 6 MMOL/L (ref 0–18)
AST SERPL-CCNC: 22 U/L (ref 15–37)
BASOPHILS # BLD AUTO: 0.02 X10(3) UL (ref 0–0.2)
BASOPHILS NFR BLD AUTO: 0.3 %
BILIRUB SERPL-MCNC: 0.3 MG/DL (ref 0.1–2)
BILIRUB UR QL STRIP.AUTO: NEGATIVE
BUN BLD-MCNC: 31 MG/DL (ref 7–18)
CALCIUM BLD-MCNC: 10.6 MG/DL (ref 8.5–10.1)
CHLORIDE SERPL-SCNC: 110 MMOL/L (ref 98–112)
CLARITY UR REFRACT.AUTO: CLEAR
CO2 SERPL-SCNC: 23 MMOL/L (ref 21–32)
COLOR UR AUTO: YELLOW
CREAT BLD-MCNC: 1.48 MG/DL
EOSINOPHIL # BLD AUTO: 0.01 X10(3) UL (ref 0–0.7)
EOSINOPHIL NFR BLD AUTO: 0.2 %
ERYTHROCYTE [DISTWIDTH] IN BLOOD BY AUTOMATED COUNT: 13.6 %
GFR SERPLBLD BASED ON 1.73 SQ M-ARVRAT: 41 ML/MIN/1.73M2 (ref 60–?)
GLOBULIN PLAS-MCNC: 3.8 G/DL (ref 2.8–4.4)
GLUCOSE BLD-MCNC: 108 MG/DL (ref 70–99)
GLUCOSE BLD-MCNC: 154 MG/DL (ref 70–99)
GLUCOSE BLD-MCNC: 165 MG/DL (ref 70–99)
GLUCOSE BLD-MCNC: 170 MG/DL (ref 70–99)
GLUCOSE BLD-MCNC: 344 MG/DL (ref 70–99)
GLUCOSE UR STRIP.AUTO-MCNC: >=1000 MG/DL
HCT VFR BLD AUTO: 32.1 %
HGB BLD-MCNC: 10.3 G/DL
HYALINE CASTS #/AREA URNS AUTO: PRESENT /LPF
IMM GRANULOCYTES # BLD AUTO: 0.04 X10(3) UL (ref 0–1)
IMM GRANULOCYTES NFR BLD: 0.6 %
KETONES UR STRIP.AUTO-MCNC: NEGATIVE MG/DL
LYMPHOCYTES # BLD AUTO: 0.82 X10(3) UL (ref 1–4)
LYMPHOCYTES NFR BLD AUTO: 12.5 %
MAGNESIUM SERPL-MCNC: 1.7 MG/DL (ref 1.6–2.6)
MCH RBC QN AUTO: 29.8 PG (ref 26–34)
MCHC RBC AUTO-ENTMCNC: 32.1 G/DL (ref 31–37)
MCV RBC AUTO: 92.8 FL
MONOCYTES # BLD AUTO: 0.4 X10(3) UL (ref 0.1–1)
MONOCYTES NFR BLD AUTO: 6.1 %
NEUTROPHILS # BLD AUTO: 5.29 X10 (3) UL (ref 1.5–7.7)
NEUTROPHILS # BLD AUTO: 5.29 X10(3) UL (ref 1.5–7.7)
NEUTROPHILS NFR BLD AUTO: 80.3 %
NITRITE UR QL STRIP.AUTO: NEGATIVE
OSMOLALITY SERPL CALC.SUM OF ELEC: 298 MOSM/KG (ref 275–295)
PH UR STRIP.AUTO: 5.5 [PH] (ref 5–8)
PHOSPHATE SERPL-MCNC: 2.5 MG/DL (ref 2.5–4.9)
PLATELET # BLD AUTO: 173 10(3)UL (ref 150–450)
POTASSIUM SERPL-SCNC: 3.7 MMOL/L (ref 3.5–5.1)
POTASSIUM SERPL-SCNC: 3.7 MMOL/L (ref 3.5–5.1)
PROT SERPL-MCNC: 6.8 G/DL (ref 6.4–8.2)
RBC # BLD AUTO: 3.46 X10(6)UL
RBC UR QL AUTO: NEGATIVE
SODIUM SERPL-SCNC: 139 MMOL/L (ref 136–145)
SP GR UR STRIP.AUTO: 1.01 (ref 1–1.03)
UROBILINOGEN UR STRIP.AUTO-MCNC: 0.2 MG/DL
WBC # BLD AUTO: 6.6 X10(3) UL (ref 4–11)

## 2022-08-02 PROCEDURE — 99233 SBSQ HOSP IP/OBS HIGH 50: CPT | Performed by: HOSPITALIST

## 2022-08-02 PROCEDURE — 99232 SBSQ HOSP IP/OBS MODERATE 35: CPT | Performed by: OTHER

## 2022-08-02 PROCEDURE — 74176 CT ABD & PELVIS W/O CONTRAST: CPT | Performed by: HOSPITALIST

## 2022-08-02 RX ORDER — BUSPIRONE HYDROCHLORIDE 10 MG/1
20 TABLET ORAL 3 TIMES DAILY
Refills: 0 | Status: SHIPPED | COMMUNITY
Start: 2022-08-02

## 2022-08-02 RX ORDER — MAGNESIUM OXIDE 400 MG/1
400 TABLET ORAL ONCE
Status: COMPLETED | OUTPATIENT
Start: 2022-08-02 | End: 2022-08-02

## 2022-08-02 RX ORDER — LORAZEPAM 0.5 MG/1
0.5 TABLET ORAL 2 TIMES DAILY PRN
Refills: 0 | Status: SHIPPED | COMMUNITY
Start: 2022-08-02

## 2022-08-02 RX ORDER — FUROSEMIDE 20 MG/1
10 TABLET ORAL
Refills: 0 | Status: SHIPPED | COMMUNITY
Start: 2022-08-02

## 2022-08-02 RX ORDER — QUETIAPINE FUMARATE 200 MG/1
300 TABLET, FILM COATED ORAL NIGHTLY
Refills: 0 | Status: SHIPPED | COMMUNITY
Start: 2022-08-02

## 2022-08-02 RX ORDER — TRAMADOL HYDROCHLORIDE 50 MG/1
50 TABLET ORAL ONCE
Status: COMPLETED | OUTPATIENT
Start: 2022-08-02 | End: 2022-08-02

## 2022-08-02 NOTE — PROGRESS NOTES
Patient is alert and oriented x4. Patient is having pain in left side and received prn medication with some relief. Patient had CT of abdomen and pelvis. Medications given per MAR. Psych has signed off. MD wanting to monitor patient overnight. Patient updated on POC. Safety precautions maintained. Call light within reach.

## 2022-08-02 NOTE — CM/SW NOTE
PT recommending 24 hr supervision. Spoke w/patient, lives with family who can provide supervision, they are able to drive home at time of dc. Patient denier dc needs/concerns. Heidi Gurrola RN,   [id number].

## 2022-08-03 VITALS
WEIGHT: 182.75 LBS | TEMPERATURE: 98 F | HEART RATE: 59 BPM | OXYGEN SATURATION: 96 % | RESPIRATION RATE: 16 BRPM | DIASTOLIC BLOOD PRESSURE: 63 MMHG | HEIGHT: 69 IN | SYSTOLIC BLOOD PRESSURE: 121 MMHG | BODY MASS INDEX: 27.07 KG/M2

## 2022-08-03 LAB
ALBUMIN SERPL-MCNC: 2.7 G/DL (ref 3.4–5)
ALBUMIN/GLOB SERPL: 0.8 {RATIO} (ref 1–2)
ALP LIVER SERPL-CCNC: 56 U/L
ALT SERPL-CCNC: 17 U/L
ANION GAP SERPL CALC-SCNC: 5 MMOL/L (ref 0–18)
AST SERPL-CCNC: 21 U/L (ref 15–37)
BASOPHILS # BLD AUTO: 0.04 X10(3) UL (ref 0–0.2)
BASOPHILS NFR BLD AUTO: 0.5 %
BILIRUB SERPL-MCNC: 0.3 MG/DL (ref 0.1–2)
BUN BLD-MCNC: 35 MG/DL (ref 7–18)
CALCIUM BLD-MCNC: 9.6 MG/DL (ref 8.5–10.1)
CHLORIDE SERPL-SCNC: 114 MMOL/L (ref 98–112)
CO2 SERPL-SCNC: 24 MMOL/L (ref 21–32)
CREAT BLD-MCNC: 1.38 MG/DL
EOSINOPHIL # BLD AUTO: 0.06 X10(3) UL (ref 0–0.7)
EOSINOPHIL NFR BLD AUTO: 0.7 %
ERYTHROCYTE [DISTWIDTH] IN BLOOD BY AUTOMATED COUNT: 13.7 %
GFR SERPLBLD BASED ON 1.73 SQ M-ARVRAT: 44 ML/MIN/1.73M2 (ref 60–?)
GLOBULIN PLAS-MCNC: 3.6 G/DL (ref 2.8–4.4)
GLUCOSE BLD-MCNC: 115 MG/DL (ref 70–99)
GLUCOSE BLD-MCNC: 131 MG/DL (ref 70–99)
GLUCOSE BLD-MCNC: 246 MG/DL (ref 70–99)
HCT VFR BLD AUTO: 31.5 %
HGB BLD-MCNC: 9.9 G/DL
IMM GRANULOCYTES # BLD AUTO: 0.09 X10(3) UL (ref 0–1)
IMM GRANULOCYTES NFR BLD: 1.1 %
LYMPHOCYTES # BLD AUTO: 1.58 X10(3) UL (ref 1–4)
LYMPHOCYTES NFR BLD AUTO: 18.7 %
MAGNESIUM SERPL-MCNC: 1.8 MG/DL (ref 1.6–2.6)
MCH RBC QN AUTO: 29.6 PG (ref 26–34)
MCHC RBC AUTO-ENTMCNC: 31.4 G/DL (ref 31–37)
MCV RBC AUTO: 94 FL
MONOCYTES # BLD AUTO: 0.72 X10(3) UL (ref 0.1–1)
MONOCYTES NFR BLD AUTO: 8.5 %
NEUTROPHILS # BLD AUTO: 5.97 X10 (3) UL (ref 1.5–7.7)
NEUTROPHILS # BLD AUTO: 5.97 X10(3) UL (ref 1.5–7.7)
NEUTROPHILS NFR BLD AUTO: 70.5 %
OSMOLALITY SERPL CALC.SUM OF ELEC: 305 MOSM/KG (ref 275–295)
PHOSPHATE SERPL-MCNC: 2.5 MG/DL (ref 2.5–4.9)
PLATELET # BLD AUTO: 163 10(3)UL (ref 150–450)
POTASSIUM SERPL-SCNC: 3.5 MMOL/L (ref 3.5–5.1)
PROT SERPL-MCNC: 6.3 G/DL (ref 6.4–8.2)
RBC # BLD AUTO: 3.35 X10(6)UL
SODIUM SERPL-SCNC: 143 MMOL/L (ref 136–145)
WBC # BLD AUTO: 8.5 X10(3) UL (ref 4–11)

## 2022-08-03 PROCEDURE — 99239 HOSP IP/OBS DSCHRG MGMT >30: CPT | Performed by: HOSPITALIST

## 2022-08-03 RX ORDER — HYDRALAZINE HYDROCHLORIDE 50 MG/1
50 TABLET, FILM COATED ORAL 2 TIMES DAILY PRN
Refills: 0 | Status: SHIPPED | COMMUNITY
Start: 2022-08-03

## 2022-08-03 RX ORDER — POTASSIUM CHLORIDE 14.9 MG/ML
20 INJECTION INTRAVENOUS ONCE
Status: DISCONTINUED | OUTPATIENT
Start: 2022-08-03 | End: 2022-08-03

## 2022-08-03 RX ORDER — MAGNESIUM OXIDE 400 MG/1
400 TABLET ORAL ONCE
Status: COMPLETED | OUTPATIENT
Start: 2022-08-03 | End: 2022-08-03

## 2022-08-03 RX ORDER — INSULIN LISPRO 100 [IU]/ML
INJECTION, SOLUTION INTRAVENOUS; SUBCUTANEOUS
Qty: 15 ML | Refills: 0 | Status: SHIPPED | OUTPATIENT
Start: 2022-08-03

## 2022-08-03 RX ORDER — INSULIN GLARGINE 100 [IU]/ML
7 INJECTION, SOLUTION SUBCUTANEOUS NIGHTLY
Refills: 0 | Status: SHIPPED | COMMUNITY
Start: 2022-08-03

## 2022-08-03 NOTE — PROGRESS NOTES
NURSING DISCHARGE NOTE    Discharged Home via Wheelchair. Accompanied by Support staff  Belongings Taken by patient/family. IV and tele removed. Paperwork given to pt. Pt left unit in stable conditions.

## 2022-08-05 NOTE — PAYOR COMM NOTE
Discharge Notification    Patient Name: Ashlyn Cunningham  Payor: JAMAR  Subscriber #: [id number]  Authorization Number: [id number]  Admit Date/Time: 7/30/2022 8:29 AM  Discharge Date/Time: 8/3/2022 2:22 PM

## 2025-01-01 ENCOUNTER — APPOINTMENT (OUTPATIENT)
Dept: CARDIOLOGY | Age: 65
DRG: 042 | End: 2025-01-01
Attending: NURSE PRACTITIONER

## 2025-01-01 ENCOUNTER — APPOINTMENT (OUTPATIENT)
Dept: CT IMAGING | Age: 65
DRG: 042 | End: 2025-01-01
Attending: STUDENT IN AN ORGANIZED HEALTH CARE EDUCATION/TRAINING PROGRAM

## 2025-01-01 ENCOUNTER — APPOINTMENT (OUTPATIENT)
Dept: GENERAL RADIOLOGY | Age: 65
DRG: 042 | End: 2025-01-01
Attending: STUDENT IN AN ORGANIZED HEALTH CARE EDUCATION/TRAINING PROGRAM

## 2025-01-01 ENCOUNTER — APPOINTMENT (OUTPATIENT)
Dept: GENERAL RADIOLOGY | Age: 65
DRG: 042 | End: 2025-01-01
Attending: INTERNAL MEDICINE

## 2025-01-01 ENCOUNTER — APPOINTMENT (OUTPATIENT)
Dept: ULTRASOUND IMAGING | Age: 65
DRG: 042 | End: 2025-01-01
Attending: INTERNAL MEDICINE

## 2025-01-01 ENCOUNTER — HOSPITAL ENCOUNTER (INPATIENT)
Age: 65
End: 2025-01-01
Attending: STUDENT IN AN ORGANIZED HEALTH CARE EDUCATION/TRAINING PROGRAM | Admitting: STUDENT IN AN ORGANIZED HEALTH CARE EDUCATION/TRAINING PROGRAM

## 2025-01-01 ENCOUNTER — APPOINTMENT (OUTPATIENT)
Dept: ULTRASOUND IMAGING | Age: 65
DRG: 042 | End: 2025-01-01
Attending: STUDENT IN AN ORGANIZED HEALTH CARE EDUCATION/TRAINING PROGRAM

## 2025-01-01 PROCEDURE — 93970 EXTREMITY STUDY: CPT

## 2025-01-01 PROCEDURE — 71045 X-RAY EXAM CHEST 1 VIEW: CPT

## 2025-01-01 PROCEDURE — 74018 RADEX ABDOMEN 1 VIEW: CPT

## 2025-01-01 PROCEDURE — 76775 US EXAM ABDO BACK WALL LIM: CPT

## 2025-04-10 ENCOUNTER — APPOINTMENT (OUTPATIENT)
Dept: GENERAL RADIOLOGY | Age: 65
DRG: 042 | End: 2025-04-10
Attending: STUDENT IN AN ORGANIZED HEALTH CARE EDUCATION/TRAINING PROGRAM

## 2025-04-10 ENCOUNTER — APPOINTMENT (OUTPATIENT)
Dept: CT IMAGING | Age: 65
DRG: 042 | End: 2025-04-10
Attending: STUDENT IN AN ORGANIZED HEALTH CARE EDUCATION/TRAINING PROGRAM

## 2025-04-10 ENCOUNTER — APPOINTMENT (OUTPATIENT)
Dept: ULTRASOUND IMAGING | Age: 65
DRG: 042 | End: 2025-04-10
Attending: STUDENT IN AN ORGANIZED HEALTH CARE EDUCATION/TRAINING PROGRAM

## 2025-04-10 ENCOUNTER — HOSPITAL ENCOUNTER (INPATIENT)
Age: 65
Discharge: HOME HEALTH | DRG: 042 | End: 2025-04-10
Attending: STUDENT IN AN ORGANIZED HEALTH CARE EDUCATION/TRAINING PROGRAM | Admitting: INTERNAL MEDICINE

## 2025-04-10 DIAGNOSIS — E87.20 METABOLIC ACIDOSIS: ICD-10-CM

## 2025-04-10 DIAGNOSIS — R41.82 ALTERED MENTAL STATUS, UNSPECIFIED ALTERED MENTAL STATUS TYPE: Primary | ICD-10-CM

## 2025-04-10 DIAGNOSIS — E87.5 HYPERKALEMIA: ICD-10-CM

## 2025-04-10 PROBLEM — G70.01: Status: ACTIVE | Noted: 2025-01-01

## 2025-04-10 LAB
ALBUMIN SERPL-MCNC: 2.5 G/DL (ref 3.4–5)
ALBUMIN SERPL-MCNC: 2.7 G/DL (ref 3.4–5)
ALBUMIN/GLOB SERPL: 0.5 {RATIO} (ref 1–2.4)
ALBUMIN/GLOB SERPL: 0.5 {RATIO} (ref 1–2.4)
ALP SERPL-CCNC: 108 UNITS/L (ref 45–117)
ALP SERPL-CCNC: 110 UNITS/L (ref 45–117)
ALT SERPL-CCNC: 13 UNITS/L
ALT SERPL-CCNC: 17 UNITS/L
AMMONIA PLAS-SCNC: 25 MCMOL/L
ANION GAP SERPL CALC-SCNC: 19 MMOL/L (ref 7–19)
ANION GAP SERPL CALC-SCNC: 19 MMOL/L (ref 7–19)
ANION GAP SERPL CALC-SCNC: 22 MMOL/L (ref 7–19)
APPEARANCE UR: CLEAR
APTT PPP: 30 SEC (ref 22–32)
AST SERPL-CCNC: 15 UNITS/L
AST SERPL-CCNC: 9 UNITS/L
B-OH-BUTYR SERPL-SCNC: 0.2 MMOL/L (ref 0–0.3)
BASE EXCESS / DEFICIT, ARTERIAL - RESPIRATORY: -10 MMOL/L (ref -2–3)
BASE EXCESS / DEFICIT, ARTERIAL - RESPIRATORY: -12 MMOL/L (ref -2–3)
BASOPHILS # BLD: 0.1 K/MCL (ref 0–0.3)
BASOPHILS NFR BLD: 0 %
BDY SITE: ABNORMAL
BDY SITE: ABNORMAL
BILIRUB SERPL-MCNC: 0.2 MG/DL (ref 0.2–1)
BILIRUB SERPL-MCNC: 0.3 MG/DL (ref 0.2–1)
BILIRUB UR QL STRIP: NEGATIVE
BODY TEMPERATURE: 37 DEGREES C
BODY TEMPERATURE: 37 DEGREES C
BUN SERPL-MCNC: 50 MG/DL (ref 6–20)
BUN SERPL-MCNC: 60 MG/DL (ref 6–20)
BUN SERPL-MCNC: 63 MG/DL (ref 6–20)
BUN/CREAT SERPL: 20 (ref 7–25)
BUN/CREAT SERPL: 22 (ref 7–25)
BUN/CREAT SERPL: 24 (ref 7–25)
CA-I BLD-SCNC: 1.35 MMOL/L (ref 1.15–1.29)
CALCIUM SERPL-MCNC: 9.5 MG/DL (ref 8.4–10.2)
CALCIUM SERPL-MCNC: 9.5 MG/DL (ref 8.4–10.2)
CALCIUM SERPL-MCNC: 9.8 MG/DL (ref 8.4–10.2)
CHLORIDE SERPL-SCNC: 100 MMOL/L (ref 97–110)
CHLORIDE SERPL-SCNC: 104 MMOL/L (ref 97–110)
CHLORIDE SERPL-SCNC: 105 MMOL/L (ref 97–110)
CO2 SERPL-SCNC: 15 MMOL/L (ref 21–32)
CO2 SERPL-SCNC: 17 MMOL/L (ref 21–32)
CO2 SERPL-SCNC: 18 MMOL/L (ref 21–32)
COHGB MFR BLDV: 0.9 %
COHGB MFR BLDV: 1.1 %
COLOR UR: ABNORMAL
CONDITION: ABNORMAL
CONDITION: ABNORMAL
CORTIS SERPL-MCNC: 18.7 MCG/DL (ref 3.4–22.5)
CREAT SERPL-MCNC: 2.51 MG/DL (ref 0.51–0.95)
CREAT SERPL-MCNC: 2.68 MG/DL (ref 0.51–0.95)
CREAT SERPL-MCNC: 2.79 MG/DL (ref 0.51–0.95)
DEPRECATED RDW RBC: 53.1 FL (ref 39–50)
DEPRECATED RDW RBC: 54.8 FL (ref 39–50)
EGFRCR SERPLBLD CKD-EPI 2021: 19 ML/MIN/{1.73_M2}
EGFRCR SERPLBLD CKD-EPI 2021: 20 ML/MIN/{1.73_M2}
EGFRCR SERPLBLD CKD-EPI 2021: 21 ML/MIN/{1.73_M2}
EOSINOPHIL # BLD: 0 K/MCL (ref 0–0.5)
EOSINOPHIL NFR BLD: 0 %
ERYTHROCYTE [DISTWIDTH] IN BLOOD: 15.8 % (ref 11–15)
ERYTHROCYTE [DISTWIDTH] IN BLOOD: 16 % (ref 11–15)
ETHANOL SERPL-MCNC: NORMAL MG/DL
FASTING DURATION TIME PATIENT: ABNORMAL H
FLUAV RNA RESP QL NAA+PROBE: NOT DETECTED
FLUBV RNA RESP QL NAA+PROBE: NOT DETECTED
GLOBULIN SER-MCNC: 5.1 G/DL (ref 2–4)
GLOBULIN SER-MCNC: 5.3 G/DL (ref 2–4)
GLUCOSE BLDC GLUCOMTR-MCNC: 233 MG/DL (ref 70–99)
GLUCOSE BLDC GLUCOMTR-MCNC: 235 MG/DL (ref 70–99)
GLUCOSE BLDC GLUCOMTR-MCNC: 243 MG/DL (ref 70–99)
GLUCOSE BLDC GLUCOMTR-MCNC: 255 MG/DL (ref 70–99)
GLUCOSE BLDC GLUCOMTR-MCNC: 263 MG/DL (ref 70–99)
GLUCOSE BLDC GLUCOMTR-MCNC: 288 MG/DL (ref 70–99)
GLUCOSE BLDC GLUCOMTR-MCNC: 402 MG/DL (ref 70–99)
GLUCOSE SERPL-MCNC: 271 MG/DL (ref 70–99)
GLUCOSE SERPL-MCNC: 274 MG/DL (ref 70–99)
GLUCOSE SERPL-MCNC: 275 MG/DL (ref 70–99)
GLUCOSE UR STRIP-MCNC: >1000 MG/DL
HCO3 BLDA-SCNC: 15 MMOL/L (ref 22–28)
HCO3 BLDA-SCNC: 17 MMOL/L (ref 22–28)
HCT VFR BLD CALC: 35.9 % (ref 36–46.5)
HCT VFR BLD CALC: 37.1 % (ref 36–46.5)
HGB BLD-MCNC: 10.8 G/DL (ref 12–15.5)
HGB BLD-MCNC: 10.9 G/DL (ref 12–15.5)
HGB BLD-MCNC: 11.2 G/DL (ref 12–15.5)
HGB BLD-MCNC: 11.5 G/DL (ref 12–15.5)
HGB UR QL STRIP: NEGATIVE
IMM GRANULOCYTES # BLD AUTO: 0.3 K/MCL (ref 0–0.2)
IMM GRANULOCYTES # BLD: 3 %
INR PPP: 1
KETONES UR STRIP-MCNC: NEGATIVE MG/DL
LACTATE BLDA-SCNC: 1.2 MMOL/L
LACTATE BLDV-SCNC: 1.7 MMOL/L (ref 0–2)
LEUKOCYTE ESTERASE UR QL STRIP: NEGATIVE
LYMPHOCYTES # BLD: 1.6 K/MCL (ref 1–4)
LYMPHOCYTES NFR BLD: 14 %
MAGNESIUM SERPL-MCNC: 2 MG/DL (ref 1.7–2.4)
MCH RBC QN AUTO: 28.2 PG (ref 26–34)
MCH RBC QN AUTO: 28.3 PG (ref 26–34)
MCHC RBC AUTO-ENTMCNC: 30.4 G/DL (ref 32–36.5)
MCHC RBC AUTO-ENTMCNC: 31 G/DL (ref 32–36.5)
MCV RBC AUTO: 91.4 FL (ref 78–100)
MCV RBC AUTO: 93 FL (ref 78–100)
METHGB MFR BLDMV: 0 %
METHGB MFR BLDMV: 0 %
MONOCYTES # BLD: 1.2 K/MCL (ref 0.3–0.9)
MONOCYTES NFR BLD: 11 %
NEUTROPHILS # BLD: 8.2 K/MCL (ref 1.8–7.7)
NEUTROPHILS NFR BLD: 72 %
NITRITE UR QL STRIP: NEGATIVE
NRBC BLD MANUAL-RTO: 0 /100 WBC
NRBC BLD MANUAL-RTO: 0 /100 WBC
NT-PROBNP SERPL-MCNC: 224 PG/ML
OXYHGB MFR BLDA: 96.4 % (ref 94–98)
OXYHGB MFR BLDA: 98.1 % (ref 94–98)
PCO2 BLDA: 40 MM HG (ref 32–45)
PCO2 BLDA: 41 MM HG (ref 32–45)
PH BLDA: 7.18 UNITS (ref 7.35–7.45)
PH BLDA: 7.23 UNITS (ref 7.35–7.45)
PH UR STRIP: 5 [PH] (ref 5–7)
PLATELET # BLD AUTO: 254 K/MCL (ref 140–450)
PLATELET # BLD AUTO: 277 K/MCL (ref 140–450)
PO2 BLDA: 129 MM HG (ref 83–108)
PO2 BLDA: 91 MM HG (ref 83–108)
POTASSIUM BLD-SCNC: 6.4 MMOL/L (ref 3.4–5.1)
POTASSIUM SERPL-SCNC: 6 MMOL/L (ref 3.4–5.1)
POTASSIUM SERPL-SCNC: 7 MMOL/L (ref 3.4–5.1)
POTASSIUM SERPL-SCNC: 7.5 MMOL/L (ref 3.4–5.1)
PROT SERPL-MCNC: 7.6 G/DL (ref 6.4–8.2)
PROT SERPL-MCNC: 8 G/DL (ref 6.4–8.2)
PROT UR STRIP-MCNC: NEGATIVE MG/DL
PROTHROMBIN TIME: 10.7 SEC (ref 9.7–11.8)
RBC # BLD: 3.86 MIL/MCL (ref 4–5.2)
RBC # BLD: 4.06 MIL/MCL (ref 4–5.2)
RSV AG NPH QL IA.RAPID: NOT DETECTED
SAO2 % BLDA: 15 % (ref 15–23)
SAO2 % BLDA: 15 % (ref 15–23)
SAO2 % BLDA: 98 % (ref 95–99)
SAO2 % BLDA: 99 % (ref 95–99)
SARS-COV-2 RNA RESP QL NAA+PROBE: NOT DETECTED
SERVICE CMNT-IMP: NORMAL
SERVICE CMNT-IMP: NORMAL
SODIUM BLD-SCNC: 131 MMOL/L (ref 135–145)
SODIUM SERPL-SCNC: 131 MMOL/L (ref 135–145)
SODIUM SERPL-SCNC: 131 MMOL/L (ref 135–145)
SODIUM SERPL-SCNC: 136 MMOL/L (ref 135–145)
SP GR UR STRIP: 1.02 (ref 1–1.03)
TSH SERPL-ACNC: 3.65 MCUNITS/ML (ref 0.35–5)
UROBILINOGEN UR STRIP-MCNC: 0.2 MG/DL
WBC # BLD: 11.4 K/MCL (ref 4.2–11)
WBC # BLD: 12.8 K/MCL (ref 4.2–11)

## 2025-04-10 PROCEDURE — 71045 X-RAY EXAM CHEST 1 VIEW: CPT

## 2025-04-10 PROCEDURE — 93971 EXTREMITY STUDY: CPT

## 2025-04-10 PROCEDURE — 99291 CRITICAL CARE FIRST HOUR: CPT | Performed by: STUDENT IN AN ORGANIZED HEALTH CARE EDUCATION/TRAINING PROGRAM

## 2025-04-10 PROCEDURE — 10004180 HB COUNTER-TRANSPORT

## 2025-04-10 PROCEDURE — 10002801 HB RX 250 W/O HCPCS: Performed by: HOSPITALIST

## 2025-04-10 PROCEDURE — 96374 THER/PROPH/DIAG INJ IV PUSH: CPT

## 2025-04-10 PROCEDURE — 70450 CT HEAD/BRAIN W/O DYE: CPT

## 2025-04-10 PROCEDURE — 96375 TX/PRO/DX INJ NEW DRUG ADDON: CPT

## 2025-04-10 PROCEDURE — 84295 ASSAY OF SERUM SODIUM: CPT

## 2025-04-10 PROCEDURE — 10002801 HB RX 250 W/O HCPCS: Performed by: STUDENT IN AN ORGANIZED HEALTH CARE EDUCATION/TRAINING PROGRAM

## 2025-04-10 PROCEDURE — 99418 PROLNG IP/OBS E/M EA 15 MIN: CPT | Performed by: STUDENT IN AN ORGANIZED HEALTH CARE EDUCATION/TRAINING PROGRAM

## 2025-04-10 PROCEDURE — 85027 COMPLETE CBC AUTOMATED: CPT | Performed by: INTERNAL MEDICINE

## 2025-04-10 PROCEDURE — 94640 AIRWAY INHALATION TREATMENT: CPT

## 2025-04-10 PROCEDURE — 94002 VENT MGMT INPAT INIT DAY: CPT

## 2025-04-10 PROCEDURE — 99255 IP/OBS CONSLTJ NEW/EST HI 80: CPT | Performed by: STUDENT IN AN ORGANIZED HEALTH CARE EDUCATION/TRAINING PROGRAM

## 2025-04-10 PROCEDURE — 85018 HEMOGLOBIN: CPT

## 2025-04-10 PROCEDURE — 10003568 RESTRAINTS NON-VIOLENT OR NON-SELF-DESTRUCTIVE: Performed by: STUDENT IN AN ORGANIZED HEALTH CARE EDUCATION/TRAINING PROGRAM

## 2025-04-10 PROCEDURE — 10002800 HB RX 250 W HCPCS: Performed by: STUDENT IN AN ORGANIZED HEALTH CARE EDUCATION/TRAINING PROGRAM

## 2025-04-10 PROCEDURE — 31500 INSERT EMERGENCY AIRWAY: CPT | Performed by: STUDENT IN AN ORGANIZED HEALTH CARE EDUCATION/TRAINING PROGRAM

## 2025-04-10 PROCEDURE — 80053 COMPREHEN METABOLIC PANEL: CPT | Performed by: STUDENT IN AN ORGANIZED HEALTH CARE EDUCATION/TRAINING PROGRAM

## 2025-04-10 PROCEDURE — 82140 ASSAY OF AMMONIA: CPT | Performed by: STUDENT IN AN ORGANIZED HEALTH CARE EDUCATION/TRAINING PROGRAM

## 2025-04-10 PROCEDURE — 10002807 HB RX 258: Performed by: INTERNAL MEDICINE

## 2025-04-10 PROCEDURE — 87040 BLOOD CULTURE FOR BACTERIA: CPT | Performed by: STUDENT IN AN ORGANIZED HEALTH CARE EDUCATION/TRAINING PROGRAM

## 2025-04-10 PROCEDURE — 80053 COMPREHEN METABOLIC PANEL: CPT | Performed by: INTERNAL MEDICINE

## 2025-04-10 PROCEDURE — 10002807 HB RX 258: Performed by: STUDENT IN AN ORGANIZED HEALTH CARE EDUCATION/TRAINING PROGRAM

## 2025-04-10 PROCEDURE — 99291 CRITICAL CARE FIRST HOUR: CPT

## 2025-04-10 PROCEDURE — 84443 ASSAY THYROID STIM HORMONE: CPT | Performed by: STUDENT IN AN ORGANIZED HEALTH CARE EDUCATION/TRAINING PROGRAM

## 2025-04-10 PROCEDURE — 82962 GLUCOSE BLOOD TEST: CPT

## 2025-04-10 PROCEDURE — 84132 ASSAY OF SERUM POTASSIUM: CPT

## 2025-04-10 PROCEDURE — 83605 ASSAY OF LACTIC ACID: CPT | Performed by: STUDENT IN AN ORGANIZED HEALTH CARE EDUCATION/TRAINING PROGRAM

## 2025-04-10 PROCEDURE — 0BH17EZ INSERTION OF ENDOTRACHEAL AIRWAY INTO TRACHEA, VIA NATURAL OR ARTIFICIAL OPENING: ICD-10-PCS | Performed by: STUDENT IN AN ORGANIZED HEALTH CARE EDUCATION/TRAINING PROGRAM

## 2025-04-10 PROCEDURE — 83880 ASSAY OF NATRIURETIC PEPTIDE: CPT | Performed by: STUDENT IN AN ORGANIZED HEALTH CARE EDUCATION/TRAINING PROGRAM

## 2025-04-10 PROCEDURE — 99292 CRITICAL CARE ADDL 30 MIN: CPT | Performed by: INTERNAL MEDICINE

## 2025-04-10 PROCEDURE — 83036 HEMOGLOBIN GLYCOSYLATED A1C: CPT | Performed by: INTERNAL MEDICINE

## 2025-04-10 PROCEDURE — 83735 ASSAY OF MAGNESIUM: CPT | Performed by: STUDENT IN AN ORGANIZED HEALTH CARE EDUCATION/TRAINING PROGRAM

## 2025-04-10 PROCEDURE — 82805 BLOOD GASES W/O2 SATURATION: CPT

## 2025-04-10 PROCEDURE — 5A1955Z RESPIRATORY VENTILATION, GREATER THAN 96 CONSECUTIVE HOURS: ICD-10-PCS | Performed by: STUDENT IN AN ORGANIZED HEALTH CARE EDUCATION/TRAINING PROGRAM

## 2025-04-10 PROCEDURE — 85610 PROTHROMBIN TIME: CPT | Performed by: STUDENT IN AN ORGANIZED HEALTH CARE EDUCATION/TRAINING PROGRAM

## 2025-04-10 PROCEDURE — 36415 COLL VENOUS BLD VENIPUNCTURE: CPT | Performed by: STUDENT IN AN ORGANIZED HEALTH CARE EDUCATION/TRAINING PROGRAM

## 2025-04-10 PROCEDURE — 83605 ASSAY OF LACTIC ACID: CPT

## 2025-04-10 PROCEDURE — 10000008 HB ROOM CHARGE ICU OR CCU

## 2025-04-10 PROCEDURE — 36600 WITHDRAWAL OF ARTERIAL BLOOD: CPT

## 2025-04-10 PROCEDURE — 10002801 HB RX 250 W/O HCPCS: Performed by: INTERNAL MEDICINE

## 2025-04-10 PROCEDURE — 81003 URINALYSIS AUTO W/O SCOPE: CPT | Performed by: STUDENT IN AN ORGANIZED HEALTH CARE EDUCATION/TRAINING PROGRAM

## 2025-04-10 PROCEDURE — 82330 ASSAY OF CALCIUM: CPT

## 2025-04-10 PROCEDURE — 99285 EMERGENCY DEPT VISIT HI MDM: CPT | Performed by: STUDENT IN AN ORGANIZED HEALTH CARE EDUCATION/TRAINING PROGRAM

## 2025-04-10 PROCEDURE — 82533 TOTAL CORTISOL: CPT | Performed by: STUDENT IN AN ORGANIZED HEALTH CARE EDUCATION/TRAINING PROGRAM

## 2025-04-10 PROCEDURE — 94660 CPAP INITIATION&MGMT: CPT

## 2025-04-10 PROCEDURE — 93005 ELECTROCARDIOGRAM TRACING: CPT | Performed by: STUDENT IN AN ORGANIZED HEALTH CARE EDUCATION/TRAINING PROGRAM

## 2025-04-10 PROCEDURE — 10002803 HB RX 637: Performed by: STUDENT IN AN ORGANIZED HEALTH CARE EDUCATION/TRAINING PROGRAM

## 2025-04-10 PROCEDURE — 10004651 HB RX, NO CHARGE ITEM: Performed by: HOSPITALIST

## 2025-04-10 PROCEDURE — 85025 COMPLETE CBC W/AUTO DIFF WBC: CPT | Performed by: STUDENT IN AN ORGANIZED HEALTH CARE EDUCATION/TRAINING PROGRAM

## 2025-04-10 PROCEDURE — 10002800 HB RX 250 W HCPCS

## 2025-04-10 PROCEDURE — 85730 THROMBOPLASTIN TIME PARTIAL: CPT | Performed by: STUDENT IN AN ORGANIZED HEALTH CARE EDUCATION/TRAINING PROGRAM

## 2025-04-10 PROCEDURE — 99291 CRITICAL CARE FIRST HOUR: CPT | Performed by: HOSPITALIST

## 2025-04-10 PROCEDURE — 10002801 HB RX 250 W/O HCPCS

## 2025-04-10 PROCEDURE — 96372 THER/PROPH/DIAG INJ SC/IM: CPT | Performed by: INTERNAL MEDICINE

## 2025-04-10 PROCEDURE — 93010 ELECTROCARDIOGRAM REPORT: CPT | Performed by: INTERNAL MEDICINE

## 2025-04-10 PROCEDURE — 0241U COVID/FLU/RSV PANEL: CPT | Performed by: STUDENT IN AN ORGANIZED HEALTH CARE EDUCATION/TRAINING PROGRAM

## 2025-04-10 PROCEDURE — 80048 BASIC METABOLIC PNL TOTAL CA: CPT | Performed by: STUDENT IN AN ORGANIZED HEALTH CARE EDUCATION/TRAINING PROGRAM

## 2025-04-10 PROCEDURE — 82375 ASSAY CARBOXYHB QUANT: CPT

## 2025-04-10 PROCEDURE — 82010 KETONE BODYS QUAN: CPT | Performed by: STUDENT IN AN ORGANIZED HEALTH CARE EDUCATION/TRAINING PROGRAM

## 2025-04-10 PROCEDURE — 10002800 HB RX 250 W HCPCS: Performed by: INTERNAL MEDICINE

## 2025-04-10 PROCEDURE — 82077 ASSAY SPEC XCP UR&BREATH IA: CPT | Performed by: STUDENT IN AN ORGANIZED HEALTH CARE EDUCATION/TRAINING PROGRAM

## 2025-04-10 RX ORDER — PANTOPRAZOLE SODIUM 40 MG/10ML
40 INJECTION, POWDER, LYOPHILIZED, FOR SOLUTION INTRAVENOUS NIGHTLY
Status: DISCONTINUED | OUTPATIENT
Start: 2025-04-10 | End: 2025-04-15 | Stop reason: HOSPADM

## 2025-04-10 RX ORDER — FUROSEMIDE 10 MG/ML
20 INJECTION INTRAMUSCULAR; INTRAVENOUS ONCE
Status: COMPLETED | OUTPATIENT
Start: 2025-04-10 | End: 2025-04-10

## 2025-04-10 RX ORDER — POLYETHYLENE GLYCOL 3350 17 G/17G
17 POWDER, FOR SOLUTION ORAL DAILY PRN
Status: DISCONTINUED | OUTPATIENT
Start: 2025-04-10 | End: 2025-04-15 | Stop reason: HOSPADM

## 2025-04-10 RX ORDER — CALCIUM CARBONATE 500 MG/1
500 TABLET, CHEWABLE ORAL EVERY 4 HOURS PRN
Status: DISCONTINUED | OUTPATIENT
Start: 2025-04-10 | End: 2025-04-15

## 2025-04-10 RX ORDER — ROCURONIUM BROMIDE 10 MG/ML
0.6 INJECTION, SOLUTION INTRAVENOUS ONCE
Status: COMPLETED | OUTPATIENT
Start: 2025-04-10 | End: 2025-04-10

## 2025-04-10 RX ORDER — NICOTINE POLACRILEX 4 MG
30 LOZENGE BUCCAL PRN
Status: DISCONTINUED | OUTPATIENT
Start: 2025-04-10 | End: 2025-04-15 | Stop reason: HOSPADM

## 2025-04-10 RX ORDER — DEXTROSE MONOHYDRATE 25 G/50ML
25 INJECTION, SOLUTION INTRAVENOUS ONCE
Status: COMPLETED | OUTPATIENT
Start: 2025-04-10 | End: 2025-04-10

## 2025-04-10 RX ORDER — DROPERIDOL 2.5 MG/ML
1.25 INJECTION, SOLUTION INTRAMUSCULAR; INTRAVENOUS ONCE
Status: DISCONTINUED | OUTPATIENT
Start: 2025-04-10 | End: 2025-04-11

## 2025-04-10 RX ORDER — ETOMIDATE 2 MG/ML
0.3 INJECTION INTRAVENOUS ONCE
Status: COMPLETED | OUTPATIENT
Start: 2025-04-10 | End: 2025-04-10

## 2025-04-10 RX ORDER — ENOXAPARIN SODIUM 100 MG/ML
30 INJECTION SUBCUTANEOUS DAILY
Status: DISCONTINUED | OUTPATIENT
Start: 2025-04-10 | End: 2025-04-15 | Stop reason: HOSPADM

## 2025-04-10 RX ORDER — ONDANSETRON 4 MG/1
4 TABLET, ORALLY DISINTEGRATING ORAL EVERY 12 HOURS PRN
Status: DISCONTINUED | OUTPATIENT
Start: 2025-04-10 | End: 2025-04-11

## 2025-04-10 RX ORDER — 0.9 % SODIUM CHLORIDE 0.9 %
10 VIAL (ML) INJECTION PRN
Status: DISCONTINUED | OUTPATIENT
Start: 2025-04-10 | End: 2025-04-15 | Stop reason: HOSPADM

## 2025-04-10 RX ORDER — DEXTROSE MONOHYDRATE 25 G/50ML
25 INJECTION, SOLUTION INTRAVENOUS PRN
Status: DISCONTINUED | OUTPATIENT
Start: 2025-04-10 | End: 2025-04-15 | Stop reason: HOSPADM

## 2025-04-10 RX ORDER — DEXTROSE MONOHYDRATE 25 G/50ML
12.5 INJECTION, SOLUTION INTRAVENOUS PRN
Status: DISCONTINUED | OUTPATIENT
Start: 2025-04-10 | End: 2025-04-15 | Stop reason: HOSPADM

## 2025-04-10 RX ORDER — NOREPINEPHRINE BITARTRATE/D5W 8 MG/250ML
PLASTIC BAG, INJECTION (ML) INTRAVENOUS
Status: COMPLETED
Start: 2025-04-10 | End: 2025-04-10

## 2025-04-10 RX ORDER — NICOTINE POLACRILEX 4 MG
15 LOZENGE BUCCAL PRN
Status: DISCONTINUED | OUTPATIENT
Start: 2025-04-10 | End: 2025-04-15 | Stop reason: HOSPADM

## 2025-04-10 RX ORDER — 0.9 % SODIUM CHLORIDE 0.9 %
2 VIAL (ML) INJECTION EVERY 12 HOURS SCHEDULED
Status: DISCONTINUED | OUTPATIENT
Start: 2025-04-10 | End: 2025-04-15 | Stop reason: HOSPADM

## 2025-04-10 RX ORDER — IPRATROPIUM BROMIDE AND ALBUTEROL SULFATE 2.5; .5 MG/3ML; MG/3ML
3 SOLUTION RESPIRATORY (INHALATION)
Status: DISCONTINUED | OUTPATIENT
Start: 2025-04-10 | End: 2025-04-13

## 2025-04-10 RX ORDER — ALBUTEROL SULFATE 0.83 MG/ML
2.5 SOLUTION RESPIRATORY (INHALATION)
Status: DISCONTINUED | OUTPATIENT
Start: 2025-04-10 | End: 2025-04-15 | Stop reason: HOSPADM

## 2025-04-10 RX ORDER — ONDANSETRON 2 MG/ML
4 INJECTION INTRAMUSCULAR; INTRAVENOUS EVERY 12 HOURS PRN
Status: DISCONTINUED | OUTPATIENT
Start: 2025-04-10 | End: 2025-04-11

## 2025-04-10 RX ORDER — HYDROCORTISONE SODIUM SUCCINATE 100 MG/2ML
50 INJECTION INTRAMUSCULAR; INTRAVENOUS EVERY 6 HOURS SCHEDULED
Status: DISCONTINUED | OUTPATIENT
Start: 2025-04-10 | End: 2025-04-13

## 2025-04-10 RX ORDER — NOREPINEPHRINE BITARTRATE/D5W 8 MG/250ML
0-30 PLASTIC BAG, INJECTION (ML) INTRAVENOUS CONTINUOUS
Status: DISCONTINUED | OUTPATIENT
Start: 2025-04-10 | End: 2025-04-10

## 2025-04-10 RX ORDER — MAGNESIUM HYDROXIDE/ALUMINUM HYDROXICE/SIMETHICONE 120; 1200; 1200 MG/30ML; MG/30ML; MG/30ML
30 SUSPENSION ORAL EVERY 4 HOURS PRN
Status: DISCONTINUED | OUTPATIENT
Start: 2025-04-10 | End: 2025-04-15

## 2025-04-10 RX ORDER — ACETAMINOPHEN 325 MG/1
650 TABLET ORAL EVERY 4 HOURS PRN
Status: DISCONTINUED | OUTPATIENT
Start: 2025-04-10 | End: 2025-04-15 | Stop reason: HOSPADM

## 2025-04-10 RX ORDER — ACETAMINOPHEN 650 MG/1
650 SUPPOSITORY RECTAL EVERY 4 HOURS PRN
Status: DISCONTINUED | OUTPATIENT
Start: 2025-04-10 | End: 2025-04-15 | Stop reason: HOSPADM

## 2025-04-10 RX ORDER — NOREPINEPHRINE BITARTRATE/D5W 8 MG/250ML
0-30 PLASTIC BAG, INJECTION (ML) INTRAVENOUS CONTINUOUS
Status: DISCONTINUED | OUTPATIENT
Start: 2025-04-10 | End: 2025-04-14 | Stop reason: SDUPTHER

## 2025-04-10 RX ORDER — LORAZEPAM 2 MG/ML
2 INJECTION INTRAMUSCULAR ONCE
Status: COMPLETED | OUTPATIENT
Start: 2025-04-10 | End: 2025-04-10

## 2025-04-10 RX ADMIN — IPRATROPIUM BROMIDE AND ALBUTEROL SULFATE 3 ML: 2.5; .5 SOLUTION RESPIRATORY (INHALATION) at 19:59

## 2025-04-10 RX ADMIN — SODIUM BICARBONATE: 84 INJECTION, SOLUTION INTRAVENOUS at 22:40

## 2025-04-10 RX ADMIN — INSULIN LISPRO 2 UNITS: 100 INJECTION, SOLUTION INTRAVENOUS; SUBCUTANEOUS at 18:09

## 2025-04-10 RX ADMIN — HYDROCORTISONE SODIUM SUCCINATE 50 MG: 100 INJECTION, POWDER, FOR SOLUTION INTRAMUSCULAR; INTRAVENOUS at 17:38

## 2025-04-10 RX ADMIN — Medication 1500 MG: at 06:45

## 2025-04-10 RX ADMIN — SODIUM CHLORIDE 5 UNITS: 9 INJECTION, SOLUTION INTRAVENOUS at 21:30

## 2025-04-10 RX ADMIN — DEXTROSE MONOHYDRATE 25 G: 25 INJECTION, SOLUTION INTRAVENOUS at 21:28

## 2025-04-10 RX ADMIN — SODIUM CHLORIDE 5 UNITS: 9 INJECTION, SOLUTION INTRAVENOUS at 05:46

## 2025-04-10 RX ADMIN — NOREPINEPHRINE BITARTRATE 2 MCG/MIN: 8 INJECTION, SOLUTION INTRAVENOUS at 18:18

## 2025-04-10 RX ADMIN — PROPOFOL INJECTABLE EMULSION 5 MCG/KG/MIN: 10 INJECTION, EMULSION INTRAVENOUS at 14:54

## 2025-04-10 RX ADMIN — PANTOPRAZOLE SODIUM 40 MG: 40 INJECTION, POWDER, FOR SOLUTION INTRAVENOUS at 21:24

## 2025-04-10 RX ADMIN — Medication 5 MCG/MIN: at 15:41

## 2025-04-10 RX ADMIN — SODIUM CHLORIDE, PRESERVATIVE FREE 2 ML: 5 INJECTION INTRAVENOUS at 21:27

## 2025-04-10 RX ADMIN — NOREPINEPHRINE BITARTRATE 5 MCG/MIN: 8 INJECTION, SOLUTION INTRAVENOUS at 15:41

## 2025-04-10 RX ADMIN — IPRATROPIUM BROMIDE AND ALBUTEROL SULFATE 3 ML: 2.5; .5 SOLUTION RESPIRATORY (INHALATION) at 15:27

## 2025-04-10 RX ADMIN — SODIUM CHLORIDE 5 UNITS: 9 INJECTION, SOLUTION INTRAVENOUS at 23:40

## 2025-04-10 RX ADMIN — DEXTROSE MONOHYDRATE 25 G: 25 INJECTION, SOLUTION INTRAVENOUS at 05:45

## 2025-04-10 RX ADMIN — DEXTROSE MONOHYDRATE 25 G: 25 INJECTION, SOLUTION INTRAVENOUS at 23:38

## 2025-04-10 RX ADMIN — FUROSEMIDE 20 MG: 10 INJECTION, SOLUTION INTRAMUSCULAR; INTRAVENOUS at 23:42

## 2025-04-10 RX ADMIN — PROPOFOL 5 MCG/KG/MIN: 10 INJECTION, EMULSION INTRAVENOUS at 14:54

## 2025-04-10 RX ADMIN — SODIUM ZIRCONIUM CYCLOSILICATE 10 G: 10 POWDER, FOR SUSPENSION ORAL at 18:46

## 2025-04-10 RX ADMIN — LORAZEPAM 2 MG: 2 INJECTION INTRAMUSCULAR; INTRAVENOUS at 17:37

## 2025-04-10 RX ADMIN — ROCURONIUM 47 MG: 50 INJECTION, SOLUTION INTRAVENOUS at 14:32

## 2025-04-10 RX ADMIN — ETOMIDATE 24 MG: 2 INJECTION INTRAVENOUS at 14:31

## 2025-04-10 SDOH — ECONOMIC STABILITY: TRANSPORTATION INSECURITY
IN THE PAST 12 MONTHS, HAS LACK OF RELIABLE TRANSPORTATION KEPT YOU FROM MEDICAL APPOINTMENTS, MEETINGS, WORK OR FROM GETTING THINGS NEEDED FOR DAILY LIVING?: NO

## 2025-04-10 SDOH — ECONOMIC STABILITY: INCOME INSECURITY: IN THE PAST 12 MONTHS, HAS THE ELECTRIC, GAS, OIL, OR WATER COMPANY THREATENED TO SHUT OFF SERVICE IN YOUR HOME?: NO

## 2025-04-10 SDOH — HEALTH STABILITY: GENERAL
BECAUSE OF A PHYSICAL, MENTAL, OR EMOTIONAL CONDITION, DO YOU HAVE SERIOUS DIFFICULTY CONCENTRATING, REMEMBERING OR MAKING DECISIONS?: NO

## 2025-04-10 SDOH — ECONOMIC STABILITY: HOUSING INSECURITY: WHAT IS YOUR LIVING SITUATION TODAY?: I HAVE A STEADY PLACE TO LIVE

## 2025-04-10 SDOH — ECONOMIC STABILITY: FOOD INSECURITY: WITHIN THE PAST 12 MONTHS, THE FOOD YOU BOUGHT JUST DIDN'T LAST AND YOU DIDN'T HAVE MONEY TO GET MORE.: NEVER TRUE

## 2025-04-10 SDOH — ECONOMIC STABILITY: HOUSING INSECURITY: DO YOU HAVE PROBLEMS WITH ANY OF THE FOLLOWING?: NONE OF THE ABOVE

## 2025-04-10 SDOH — SOCIAL STABILITY: SOCIAL NETWORK: SUPPORT SYSTEMS: CHILDREN;SIBLINGS

## 2025-04-10 SDOH — ECONOMIC STABILITY: HOUSING INSECURITY: WHAT IS YOUR LIVING SITUATION TODAY?: SIBLINGS

## 2025-04-10 SDOH — HEALTH STABILITY: GENERAL: BECAUSE OF A PHYSICAL, MENTAL, OR EMOTIONAL CONDITION, DO YOU HAVE DIFFICULTY DOING ERRANDS ALONE?: NO

## 2025-04-10 SDOH — SOCIAL STABILITY: SOCIAL NETWORK
HOW OFTEN DO YOU SEE OR TALK TO PEOPLE THAT YOU CARE ABOUT AND FEEL CLOSE TO? (FOR EXAMPLE: TALKING TO FRIENDS ON THE PHONE, VISITING FRIENDS OR FAMILY, GOING TO CHURCH OR CLUB MEETINGS): 5 OR MORE TIMES A WEEK

## 2025-04-10 SDOH — HEALTH STABILITY: PHYSICAL HEALTH: DO YOU HAVE SERIOUS DIFFICULTY WALKING OR CLIMBING STAIRS?: YES

## 2025-04-10 SDOH — HEALTH STABILITY: PHYSICAL HEALTH: DO YOU HAVE DIFFICULTY DRESSING OR BATHING?: NO

## 2025-04-10 SDOH — ECONOMIC STABILITY: GENERAL

## 2025-04-10 SDOH — ECONOMIC STABILITY: HOUSING INSECURITY: WHAT IS YOUR LIVING SITUATION TODAY?: HOUSE

## 2025-04-10 ASSESSMENT — MOVEMENT AND STRENGTH ASSESSMENTS
FACE_JAW: FACE SYMMETRICAL
HEAD_NECK: FULL RANGE OF MOTION
RUE: EQUAL STRENGTH/TONE/MOVEMENT
RLE: EQUAL STRENGTH/TONE/MOVEMENT
RLE: EQUAL STRENGTH/TONE/MOVEMENT
LUE: EQUAL STRENGTH/TONE/MOVEMENT
ALL_EXTREMITIES: EQUAL STRENGTH/TONE/MOVEMENT
HEAD_NECK: FULL RANGE OF MOTION
LUE: EQUAL STRENGTH/TONE/MOVEMENT
RLE: EQUAL STRENGTH/TONE/MOVEMENT
RUE: EQUAL STRENGTH/TONE/MOVEMENT
HEAD_NECK: FULL RANGE OF MOTION
LLE: EQUAL STRENGTH/TONE/MOVEMENT
ALL_EXTREMITIES: EQUAL STRENGTH/TONE/MOVEMENT
RUE: EQUAL STRENGTH/TONE/MOVEMENT
RLE: EQUAL STRENGTH/TONE/MOVEMENT
RUE: EQUAL STRENGTH/TONE/MOVEMENT
RUE: EQUAL STRENGTH/TONE/MOVEMENT
LLE: EQUAL STRENGTH/TONE/MOVEMENT
LLE: EQUAL STRENGTH/TONE/MOVEMENT
LUE: EQUAL STRENGTH/TONE/MOVEMENT
LUE: EQUAL STRENGTH/TONE/MOVEMENT
HEAD_NECK: FULL RANGE OF MOTION
FACE_JAW: FACE SYMMETRICAL
LUE: EQUAL STRENGTH/TONE/MOVEMENT
RLE: EQUAL STRENGTH/TONE/MOVEMENT
HEAD_NECK: FULL RANGE OF MOTION
RUE: EQUAL STRENGTH/TONE/MOVEMENT
FACE_JAW: FACE SYMMETRICAL
FACE_JAW: FACE SYMMETRICAL
LLE: EQUAL STRENGTH/TONE/MOVEMENT
ALL_EXTREMITIES: EQUAL STRENGTH/TONE/MOVEMENT
ALL_EXTREMITIES: EQUAL STRENGTH/TONE/MOVEMENT
LUE: EQUAL STRENGTH/TONE/MOVEMENT
RLE: EQUAL STRENGTH/TONE/MOVEMENT
FACE_JAW: FACE SYMMETRICAL
LLE: EQUAL STRENGTH/TONE/MOVEMENT
LLE: EQUAL STRENGTH/TONE/MOVEMENT
HEAD_NECK: FULL RANGE OF MOTION
FACE_JAW: FACE SYMMETRICAL
ALL_EXTREMITIES: EQUAL STRENGTH/TONE/MOVEMENT
ALL_EXTREMITIES: EQUAL STRENGTH/TONE/MOVEMENT

## 2025-04-10 ASSESSMENT — ENCOUNTER SYMPTOMS: ALTERED MENTAL STATUS: 1

## 2025-04-10 ASSESSMENT — COLUMBIA-SUICIDE SEVERITY RATING SCALE - C-SSRS
IS THE PATIENT ABLE TO COMPLETE C-SSRS: NO, DEFER TO LATER TIME
IS THE PATIENT ABLE TO COMPLETE C-SSRS: NO, DEFER TO LATER TIME

## 2025-04-10 ASSESSMENT — PATIENT HEALTH QUESTIONNAIRE - PHQ9
IS PATIENT ABLE TO COMPLETE PHQ2 OR PHQ9: NO, DEFER TO LATER TIME

## 2025-04-10 ASSESSMENT — ACTIVITIES OF DAILY LIVING (ADL)
ADL_BEFORE_ADMISSION: INDEPENDENT
RECENT_DECLINE_ADL: YES, ACUTE ILLNESS WITHOUT THERAPY NEEDS
ADL_SHORT_OF_BREATH: YES
ADL_SCORE: 12

## 2025-04-10 ASSESSMENT — PAIN SCALES - GENERAL: PAINLEVEL_OUTOF10: 8

## 2025-04-10 ASSESSMENT — LIFESTYLE VARIABLES
HOW OFTEN DO YOU HAVE A DRINK CONTAINING ALCOHOL: NEVER
AUDIT-C TOTAL SCORE: 0
ALCOHOL_USE_STATUS: NO OR LOW RISK WITH VALIDATED TOOL
HOW OFTEN DO YOU HAVE 6 OR MORE DRINKS ON ONE OCCASION: NEVER
HOW MANY STANDARD DRINKS CONTAINING ALCOHOL DO YOU HAVE ON A TYPICAL DAY: 0,1 OR 2

## 2025-04-10 ASSESSMENT — PAIN SCALES - BEHAVIORAL PAIN SCALE (BPS)
BPS_SCORE: 4
BPS_SCORE: 3

## 2025-04-11 LAB
ANION GAP SERPL CALC-SCNC: 15 MMOL/L (ref 7–19)
ANION GAP SERPL CALC-SCNC: 19 MMOL/L (ref 7–19)
ATRIAL RATE (BPM): 110
BASE EXCESS / DEFICIT, ARTERIAL - RESPIRATORY: -7 MMOL/L (ref -2–3)
BASOPHILS # BLD: 0 K/MCL (ref 0–0.3)
BASOPHILS NFR BLD: 1 %
BDY SITE: ABNORMAL
BODY TEMPERATURE: 37 DEGREES C
BUN SERPL-MCNC: 49 MG/DL (ref 6–20)
BUN SERPL-MCNC: 56 MG/DL (ref 6–20)
BUN/CREAT SERPL: 23 (ref 7–25)
BUN/CREAT SERPL: 29 (ref 7–25)
CALCIUM SERPL-MCNC: 9.2 MG/DL (ref 8.4–10.2)
CALCIUM SERPL-MCNC: 9.3 MG/DL (ref 8.4–10.2)
CHLORIDE SERPL-SCNC: 101 MMOL/L (ref 97–110)
CHLORIDE SERPL-SCNC: 97 MMOL/L (ref 97–110)
CO2 SERPL-SCNC: 23 MMOL/L (ref 21–32)
CO2 SERPL-SCNC: 28 MMOL/L (ref 21–32)
COHGB MFR BLDV: 0.5 %
CONDITION: ABNORMAL
CREAT SERPL-MCNC: 1.69 MG/DL (ref 0.51–0.95)
CREAT SERPL-MCNC: 2.39 MG/DL (ref 0.51–0.95)
DEPRECATED RDW RBC: 52.6 FL (ref 39–50)
EGFRCR SERPLBLD CKD-EPI 2021: 22 ML/MIN/{1.73_M2}
EGFRCR SERPLBLD CKD-EPI 2021: 34 ML/MIN/{1.73_M2}
EOSINOPHIL # BLD: 0.1 K/MCL (ref 0–0.5)
EOSINOPHIL NFR BLD: 1 %
ERYTHROCYTE [DISTWIDTH] IN BLOOD: 15.6 % (ref 11–15)
FASTING DURATION TIME PATIENT: ABNORMAL H
FASTING DURATION TIME PATIENT: ABNORMAL H
GLUCOSE BLDC GLUCOMTR-MCNC: 297 MG/DL (ref 70–99)
GLUCOSE BLDC GLUCOMTR-MCNC: 328 MG/DL (ref 70–99)
GLUCOSE BLDC GLUCOMTR-MCNC: 360 MG/DL (ref 70–99)
GLUCOSE BLDC GLUCOMTR-MCNC: 397 MG/DL (ref 70–99)
GLUCOSE BLDC GLUCOMTR-MCNC: 421 MG/DL (ref 70–99)
GLUCOSE SERPL-MCNC: 370 MG/DL (ref 70–99)
GLUCOSE SERPL-MCNC: 468 MG/DL (ref 70–99)
HBA1C MFR BLD: 11.1 % (ref 4.5–5.6)
HCO3 BLDA-SCNC: 21 MMOL/L (ref 22–28)
HCT VFR BLD CALC: 34.2 % (ref 36–46.5)
HGB BLD-MCNC: 10.5 G/DL (ref 12–15.5)
HGB BLD-MCNC: 11.2 G/DL (ref 12–15.5)
IMM GRANULOCYTES # BLD AUTO: 0.1 K/MCL (ref 0–0.2)
IMM GRANULOCYTES # BLD: 1 %
LYMPHOCYTES # BLD: 0.2 K/MCL (ref 1–4)
LYMPHOCYTES NFR BLD: 3 %
MAGNESIUM SERPL-MCNC: 2.2 MG/DL (ref 1.7–2.4)
MCH RBC QN AUTO: 28 PG (ref 26–34)
MCHC RBC AUTO-ENTMCNC: 30.7 G/DL (ref 32–36.5)
MCV RBC AUTO: 91.2 FL (ref 78–100)
METHGB MFR BLDMV: 0.2 %
MONOCYTES # BLD: 0.3 K/MCL (ref 0.3–0.9)
MONOCYTES NFR BLD: 4 %
NEUTROPHILS # BLD: 8.1 K/MCL (ref 1.8–7.7)
NEUTROPHILS NFR BLD: 90 %
NRBC BLD MANUAL-RTO: 0 /100 WBC
OXYHGB MFR BLDA: 97.7 % (ref 94–98)
P AXIS (DEGREES): 64
PCO2 BLDA: 51 MM HG (ref 32–45)
PH BLDA: 7.22 UNITS (ref 7.35–7.45)
PHOSPHATE SERPL-MCNC: 5.5 MG/DL (ref 2.4–4.7)
PLATELET # BLD AUTO: 250 K/MCL (ref 140–450)
PO2 BLDA: 120 MM HG (ref 83–108)
POTASSIUM SERPL-SCNC: 4.5 MMOL/L (ref 3.4–5.1)
POTASSIUM SERPL-SCNC: 5.5 MMOL/L (ref 3.4–5.1)
PR-INTERVAL (MSEC): 136
PROCALCITONIN SERPL IA-MCNC: 1.4 NG/ML
QRS-INTERVAL (MSEC): 84
QT-INTERVAL (MSEC): 318
QTC: 430
R AXIS (DEGREES): 58
RBC # BLD: 3.75 MIL/MCL (ref 4–5.2)
REPORT TEXT: NORMAL
SAO2 % BLDA: 16 % (ref 15–23)
SAO2 % BLDA: 98 % (ref 95–99)
SODIUM SERPL-SCNC: 133 MMOL/L (ref 135–145)
SODIUM SERPL-SCNC: 139 MMOL/L (ref 135–145)
T AXIS (DEGREES): 75
VENTRICULAR RATE EKG/MIN (BPM): 110
WBC # BLD: 8.9 K/MCL (ref 4.2–11)

## 2025-04-11 PROCEDURE — 84145 PROCALCITONIN (PCT): CPT | Performed by: INTERNAL MEDICINE

## 2025-04-11 PROCEDURE — 36600 WITHDRAWAL OF ARTERIAL BLOOD: CPT

## 2025-04-11 PROCEDURE — 10002800 HB RX 250 W HCPCS: Performed by: HOSPITALIST

## 2025-04-11 PROCEDURE — 10002801 HB RX 250 W/O HCPCS: Performed by: INTERNAL MEDICINE

## 2025-04-11 PROCEDURE — 80048 BASIC METABOLIC PNL TOTAL CA: CPT | Performed by: INTERNAL MEDICINE

## 2025-04-11 PROCEDURE — 10002800 HB RX 250 W HCPCS: Performed by: INTERNAL MEDICINE

## 2025-04-11 PROCEDURE — 06HM33Z INSERTION OF INFUSION DEVICE INTO RIGHT FEMORAL VEIN, PERCUTANEOUS APPROACH: ICD-10-PCS | Performed by: INTERNAL MEDICINE

## 2025-04-11 PROCEDURE — 10002801 HB RX 250 W/O HCPCS: Performed by: HOSPITALIST

## 2025-04-11 PROCEDURE — 36415 COLL VENOUS BLD VENIPUNCTURE: CPT | Performed by: HOSPITALIST

## 2025-04-11 PROCEDURE — 80048 BASIC METABOLIC PNL TOTAL CA: CPT | Performed by: HOSPITALIST

## 2025-04-11 PROCEDURE — 94003 VENT MGMT INPAT SUBQ DAY: CPT

## 2025-04-11 PROCEDURE — 85018 HEMOGLOBIN: CPT

## 2025-04-11 PROCEDURE — 10002803 HB RX 637: Performed by: STUDENT IN AN ORGANIZED HEALTH CARE EDUCATION/TRAINING PROGRAM

## 2025-04-11 PROCEDURE — 96372 THER/PROPH/DIAG INJ SC/IM: CPT | Performed by: HOSPITALIST

## 2025-04-11 PROCEDURE — 99233 SBSQ HOSP IP/OBS HIGH 50: CPT | Performed by: STUDENT IN AN ORGANIZED HEALTH CARE EDUCATION/TRAINING PROGRAM

## 2025-04-11 PROCEDURE — 96372 THER/PROPH/DIAG INJ SC/IM: CPT | Performed by: INTERNAL MEDICINE

## 2025-04-11 PROCEDURE — 10002807 HB RX 258: Performed by: INTERNAL MEDICINE

## 2025-04-11 PROCEDURE — 6A551Z3 PHERESIS OF PLASMA, MULTIPLE: ICD-10-PCS | Performed by: STUDENT IN AN ORGANIZED HEALTH CARE EDUCATION/TRAINING PROGRAM

## 2025-04-11 PROCEDURE — 84100 ASSAY OF PHOSPHORUS: CPT

## 2025-04-11 PROCEDURE — 83735 ASSAY OF MAGNESIUM: CPT | Performed by: INTERNAL MEDICINE

## 2025-04-11 PROCEDURE — 10004651 HB RX, NO CHARGE ITEM: Performed by: HOSPITALIST

## 2025-04-11 PROCEDURE — 10002801 HB RX 250 W/O HCPCS

## 2025-04-11 PROCEDURE — 96372 THER/PROPH/DIAG INJ SC/IM: CPT

## 2025-04-11 PROCEDURE — 10002800 HB RX 250 W HCPCS: Performed by: STUDENT IN AN ORGANIZED HEALTH CARE EDUCATION/TRAINING PROGRAM

## 2025-04-11 PROCEDURE — 94640 AIRWAY INHALATION TREATMENT: CPT

## 2025-04-11 PROCEDURE — P9045 ALBUMIN (HUMAN), 5%, 250 ML: HCPCS | Performed by: INTERNAL MEDICINE

## 2025-04-11 PROCEDURE — 99233 SBSQ HOSP IP/OBS HIGH 50: CPT | Performed by: HOSPITALIST

## 2025-04-11 PROCEDURE — 10002800 HB RX 250 W HCPCS

## 2025-04-11 PROCEDURE — 85025 COMPLETE CBC W/AUTO DIFF WBC: CPT | Performed by: HOSPITALIST

## 2025-04-11 PROCEDURE — 10000008 HB ROOM CHARGE ICU OR CCU

## 2025-04-11 PROCEDURE — 82805 BLOOD GASES W/O2 SATURATION: CPT

## 2025-04-11 RX ORDER — ALBUMIN HUMAN 50 G/1000ML
150 SOLUTION INTRAVENOUS
Status: DISCONTINUED | OUTPATIENT
Start: 2025-04-11 | End: 2025-04-15 | Stop reason: HOSPADM

## 2025-04-11 RX ORDER — DEXMEDETOMIDINE HYDROCHLORIDE 4 UG/ML
0-1 INJECTION, SOLUTION INTRAVENOUS CONTINUOUS
Status: DISCONTINUED | OUTPATIENT
Start: 2025-04-11 | End: 2025-04-14

## 2025-04-11 RX ORDER — SODIUM CITRATE 4 % (5 ML)
3 SYRINGE (ML) MISCELLANEOUS
Status: DISCONTINUED | OUTPATIENT
Start: 2025-04-11 | End: 2025-04-15 | Stop reason: HOSPADM

## 2025-04-11 RX ORDER — DEXTROSE MONOHYDRATE AND SODIUM CHLORIDE 5; .45 G/100ML; G/100ML
INJECTION, SOLUTION INTRAVENOUS CONTINUOUS
Status: DISCONTINUED | OUTPATIENT
Start: 2025-04-11 | End: 2025-04-11 | Stop reason: SDUPTHER

## 2025-04-11 RX ORDER — ZIPRASIDONE HYDROCHLORIDE 80 MG/1
80 CAPSULE ORAL 2 TIMES DAILY WITH MEALS
Status: DISCONTINUED | OUTPATIENT
Start: 2025-04-11 | End: 2025-04-11 | Stop reason: SDUPTHER

## 2025-04-11 RX ORDER — ALBUMIN HUMAN 50 G/1000ML
175 SOLUTION INTRAVENOUS
Status: DISCONTINUED | OUTPATIENT
Start: 2025-04-11 | End: 2025-04-11

## 2025-04-11 RX ORDER — ZIPRASIDONE MESYLATE 20 MG/ML
20 INJECTION, POWDER, LYOPHILIZED, FOR SOLUTION INTRAMUSCULAR ONCE
Status: COMPLETED | OUTPATIENT
Start: 2025-04-11 | End: 2025-04-11

## 2025-04-11 RX ORDER — HUMAN INSULIN 100 [IU]/ML
INJECTION, SOLUTION SUBCUTANEOUS EVERY 6 HOURS SCHEDULED
Status: DISCONTINUED | OUTPATIENT
Start: 2025-04-11 | End: 2025-04-15 | Stop reason: HOSPADM

## 2025-04-11 RX ORDER — INSULIN GLARGINE 100 [IU]/ML
10 INJECTION, SOLUTION SUBCUTANEOUS NIGHTLY
Status: DISCONTINUED | OUTPATIENT
Start: 2025-04-11 | End: 2025-04-15 | Stop reason: HOSPADM

## 2025-04-11 RX ORDER — CALCIUM CARBONATE 500 MG/1
3000 TABLET, CHEWABLE ORAL PRN
Status: DISCONTINUED | OUTPATIENT
Start: 2025-04-11 | End: 2025-04-11 | Stop reason: CLARIF

## 2025-04-11 RX ORDER — ALBUMIN HUMAN 50 G/1000ML
12.5 SOLUTION INTRAVENOUS PRN
Status: DISCONTINUED | OUTPATIENT
Start: 2025-04-11 | End: 2025-04-15 | Stop reason: HOSPADM

## 2025-04-11 RX ORDER — ANTICOAGULANT CITRATE DEXTROSE SOLUTION FORMULA A 12.25; 11; 3.65 G/500ML; G/500ML; G/500ML
SOLUTION INTRAVENOUS CONTINUOUS PRN
Status: DISCONTINUED | OUTPATIENT
Start: 2025-04-11 | End: 2025-04-15 | Stop reason: HOSPADM

## 2025-04-11 RX ORDER — DEXMEDETOMIDINE HYDROCHLORIDE 4 UG/ML
INJECTION, SOLUTION INTRAVENOUS
Status: COMPLETED
Start: 2025-04-11 | End: 2025-04-11

## 2025-04-11 RX ORDER — SODIUM CHLORIDE 450 MG/100ML
INJECTION, SOLUTION INTRAVENOUS CONTINUOUS
Status: DISCONTINUED | OUTPATIENT
Start: 2025-04-11 | End: 2025-04-11 | Stop reason: ALTCHOICE

## 2025-04-11 RX ORDER — VASOPRESSIN IN DEXTROSE 5 % 20/100 ML
0.04 PLASTIC BAG, INJECTION (ML) INTRAVENOUS CONTINUOUS
Status: DISCONTINUED | OUTPATIENT
Start: 2025-04-11 | End: 2025-04-15 | Stop reason: HOSPADM

## 2025-04-11 RX ORDER — INDOMETHACIN 25 MG/1
50 CAPSULE ORAL ONCE
Status: COMPLETED | OUTPATIENT
Start: 2025-04-11 | End: 2025-04-11

## 2025-04-11 RX ADMIN — SODIUM CHLORIDE, PRESERVATIVE FREE 2 ML: 5 INJECTION INTRAVENOUS at 08:19

## 2025-04-11 RX ADMIN — DEXMEDETOMIDINE HYDROCHLORIDE 1 MCG/KG/HR: 400 INJECTION INTRAVENOUS at 22:45

## 2025-04-11 RX ADMIN — SODIUM BICARBONATE: 84 INJECTION, SOLUTION INTRAVENOUS at 19:24

## 2025-04-11 RX ADMIN — ZIPRASIDONE MESYLATE 20 MG: 20 INJECTION, POWDER, LYOPHILIZED, FOR SOLUTION INTRAMUSCULAR at 11:05

## 2025-04-11 RX ADMIN — Medication 125 MCG/HR: at 21:44

## 2025-04-11 RX ADMIN — PROPOFOL INJECTABLE EMULSION 40 MCG/KG/MIN: 10 INJECTION, EMULSION INTRAVENOUS at 07:33

## 2025-04-11 RX ADMIN — DEXMEDETOMIDINE HYDROCHLORIDE 0.4 MCG/KG/HR: 400 INJECTION INTRAVENOUS at 08:01

## 2025-04-11 RX ADMIN — VASOPRESSIN 0.04 UNITS/MIN: 0.2 INJECTION INTRAVENOUS at 11:05

## 2025-04-11 RX ADMIN — HYDROCORTISONE SODIUM SUCCINATE 50 MG: 100 INJECTION, POWDER, FOR SOLUTION INTRAMUSCULAR; INTRAVENOUS at 00:37

## 2025-04-11 RX ADMIN — SODIUM BICARBONATE: 84 INJECTION, SOLUTION INTRAVENOUS at 10:34

## 2025-04-11 RX ADMIN — NOREPINEPHRINE BITARTRATE 30 MCG/MIN: 8 INJECTION, SOLUTION INTRAVENOUS at 11:24

## 2025-04-11 RX ADMIN — ENOXAPARIN SODIUM 30 MG: 100 INJECTION SUBCUTANEOUS at 08:18

## 2025-04-11 RX ADMIN — SODIUM ZIRCONIUM CYCLOSILICATE 10 G: 10 POWDER, FOR SUSPENSION ORAL at 08:29

## 2025-04-11 RX ADMIN — PROPOFOL INJECTABLE EMULSION 75 MCG/KG/MIN: 10 INJECTION, EMULSION INTRAVENOUS at 11:34

## 2025-04-11 RX ADMIN — HYDROCORTISONE SODIUM SUCCINATE 50 MG: 100 INJECTION, POWDER, FOR SOLUTION INTRAMUSCULAR; INTRAVENOUS at 19:26

## 2025-04-11 RX ADMIN — SODIUM BICARBONATE 50 MEQ: 84 INJECTION INTRAVENOUS at 17:00

## 2025-04-11 RX ADMIN — DEXMEDETOMIDINE HYDROCHLORIDE 1 MCG/KG/HR: 400 INJECTION INTRAVENOUS at 17:42

## 2025-04-11 RX ADMIN — IPRATROPIUM BROMIDE AND ALBUTEROL SULFATE 3 ML: 2.5; .5 SOLUTION RESPIRATORY (INHALATION) at 19:30

## 2025-04-11 RX ADMIN — DEXMEDETOMIDINE HYDROCHLORIDE 1 MCG/KG/HR: 400 INJECTION INTRAVENOUS at 12:49

## 2025-04-11 RX ADMIN — IPRATROPIUM BROMIDE AND ALBUTEROL SULFATE 3 ML: 2.5; .5 SOLUTION RESPIRATORY (INHALATION) at 11:09

## 2025-04-11 RX ADMIN — HYDROCORTISONE SODIUM SUCCINATE 50 MG: 100 INJECTION, POWDER, FOR SOLUTION INTRAMUSCULAR; INTRAVENOUS at 06:06

## 2025-04-11 RX ADMIN — ZIPRASIDONE MESYLATE 80 MG: 20 INJECTION, POWDER, LYOPHILIZED, FOR SOLUTION INTRAMUSCULAR at 21:29

## 2025-04-11 RX ADMIN — PROPOFOL INJECTABLE EMULSION 25 MCG/KG/MIN: 10 INJECTION, EMULSION INTRAVENOUS at 03:46

## 2025-04-11 RX ADMIN — SODIUM BICARBONATE: 84 INJECTION, SOLUTION INTRAVENOUS at 08:23

## 2025-04-11 RX ADMIN — Medication 50 MCG/HR: at 05:15

## 2025-04-11 RX ADMIN — INSULIN HUMAN 12 UNITS: 100 INJECTION, SOLUTION PARENTERAL at 21:15

## 2025-04-11 RX ADMIN — INSULIN GLARGINE 10 UNITS: 100 INJECTION, SOLUTION SUBCUTANEOUS at 19:26

## 2025-04-11 RX ADMIN — PROPOFOL INJECTABLE EMULSION 75 MCG/KG/MIN: 10 INJECTION, EMULSION INTRAVENOUS at 14:44

## 2025-04-11 RX ADMIN — ALBUMIN (HUMAN) 150 G: 12.5 INJECTION, SOLUTION INTRAVENOUS at 19:36

## 2025-04-11 RX ADMIN — PROPOFOL INJECTABLE EMULSION 75 MCG/KG/MIN: 10 INJECTION, EMULSION INTRAVENOUS at 16:56

## 2025-04-11 RX ADMIN — NOREPINEPHRINE BITARTRATE 10 MCG/MIN: 8 INJECTION, SOLUTION INTRAVENOUS at 19:08

## 2025-04-11 RX ADMIN — IPRATROPIUM BROMIDE AND ALBUTEROL SULFATE 3 ML: 2.5; .5 SOLUTION RESPIRATORY (INHALATION) at 07:42

## 2025-04-11 RX ADMIN — PROPOFOL INJECTABLE EMULSION 75 MCG/KG/MIN: 10 INJECTION, EMULSION INTRAVENOUS at 21:51

## 2025-04-11 RX ADMIN — HYDROCORTISONE SODIUM SUCCINATE 50 MG: 100 INJECTION, POWDER, FOR SOLUTION INTRAMUSCULAR; INTRAVENOUS at 12:50

## 2025-04-11 RX ADMIN — INSULIN LISPRO 8 UNITS: 100 INJECTION, SOLUTION INTRAVENOUS; SUBCUTANEOUS at 00:43

## 2025-04-11 RX ADMIN — ANTICOAGULANT CITRATE DEXTROSE SOLUTION FORMULA A: 12.25; 11; 3.65 SOLUTION INTRAVENOUS at 19:36

## 2025-04-11 RX ADMIN — CALCIUM GLUCONATE 3000 MG: 98 INJECTION, SOLUTION INTRAVENOUS at 19:37

## 2025-04-11 RX ADMIN — SODIUM CHLORIDE, PRESERVATIVE FREE 2 ML: 5 INJECTION INTRAVENOUS at 21:17

## 2025-04-11 RX ADMIN — NOREPINEPHRINE BITARTRATE 30 MCG/MIN: 8 INJECTION, SOLUTION INTRAVENOUS at 06:55

## 2025-04-11 RX ADMIN — INSULIN LISPRO 4 UNITS: 100 INJECTION, SOLUTION INTRAVENOUS; SUBCUTANEOUS at 12:50

## 2025-04-11 RX ADMIN — PANTOPRAZOLE SODIUM 40 MG: 40 INJECTION, POWDER, FOR SOLUTION INTRAVENOUS at 21:17

## 2025-04-11 RX ADMIN — INSULIN LISPRO 3 UNITS: 100 INJECTION, SOLUTION INTRAVENOUS; SUBCUTANEOUS at 08:04

## 2025-04-11 RX ADMIN — IPRATROPIUM BROMIDE AND ALBUTEROL SULFATE 3 ML: 2.5; .5 SOLUTION RESPIRATORY (INHALATION) at 14:58

## 2025-04-11 RX ADMIN — PROPOFOL INJECTABLE EMULSION 75 MCG/KG/MIN: 10 INJECTION, EMULSION INTRAVENOUS at 19:25

## 2025-04-11 SDOH — ECONOMIC STABILITY: HOUSING INSECURITY: DO YOU HAVE PROBLEMS WITH ANY OF THE FOLLOWING?: NONE OF THE ABOVE

## 2025-04-11 ASSESSMENT — PAIN SCALES - BEHAVIORAL PAIN SCALE (BPS)
BPS_SCORE: 9

## 2025-04-11 ASSESSMENT — COGNITIVE AND FUNCTIONAL STATUS - GENERAL
BECAUSE OF A PHYSICAL, MENTAL, OR EMOTIONAL CONDITION, DO YOU HAVE SERIOUS DIFFICULTY CONCENTRATING, REMEMBERING OR MAKING DECISIONS: NO
BECAUSE OF A PHYSICAL, MENTAL, OR EMOTIONAL CONDITION, DO YOU HAVE DIFFICULTY DOING ERRANDS ALONE: YES

## 2025-04-11 ASSESSMENT — PATIENT HEALTH QUESTIONNAIRE - PHQ9: IS PATIENT ABLE TO COMPLETE PHQ2 OR PHQ9: NO, DEFER TO LATER TIME

## 2025-04-12 PROBLEM — E11.9 TYPE 2 DIABETES MELLITUS WITHOUT COMPLICATION, WITHOUT LONG-TERM CURRENT USE OF INSULIN (CMD): Status: ACTIVE | Noted: 2025-01-01

## 2025-04-12 PROBLEM — R57.9 SHOCK  (CMD): Status: ACTIVE | Noted: 2025-04-12

## 2025-04-12 PROBLEM — M32.8 OTHER FORMS OF SYSTEMIC LUPUS ERYTHEMATOSUS (CMD): Status: ACTIVE | Noted: 2025-01-01

## 2025-04-12 PROBLEM — E87.5 HYPERKALEMIA: Status: ACTIVE | Noted: 2025-04-12

## 2025-04-12 PROBLEM — I10 ESSENTIAL HYPERTENSION: Status: ACTIVE | Noted: 2025-01-01

## 2025-04-12 PROBLEM — G70.00 MG (MYASTHENIA GRAVIS)  (CMD): Status: ACTIVE | Noted: 2025-04-12

## 2025-04-12 PROBLEM — M32.8 OTHER FORMS OF SYSTEMIC LUPUS ERYTHEMATOSUS (CMD): Chronic | Status: ACTIVE | Noted: 2025-01-01

## 2025-04-12 PROBLEM — G70.00 MG (MYASTHENIA GRAVIS)  (CMD): Chronic | Status: ACTIVE | Noted: 2025-04-12

## 2025-04-12 PROBLEM — J44.9 COPD WITHOUT EXACERBATION  (CMD): Chronic | Status: ACTIVE | Noted: 2025-01-01

## 2025-04-12 PROBLEM — J44.9 COPD WITHOUT EXACERBATION  (CMD): Status: ACTIVE | Noted: 2025-01-01

## 2025-04-12 PROBLEM — E11.9 TYPE 2 DIABETES MELLITUS WITHOUT COMPLICATION, WITHOUT LONG-TERM CURRENT USE OF INSULIN (CMD): Chronic | Status: ACTIVE | Noted: 2025-01-01

## 2025-04-12 PROBLEM — N17.9 ACUTE KIDNEY INJURY SUPERIMPOSED ON CHRONIC KIDNEY DISEASE (CMD): Status: ACTIVE | Noted: 2025-01-01

## 2025-04-12 PROBLEM — I10 ESSENTIAL HYPERTENSION: Chronic | Status: ACTIVE | Noted: 2025-04-12

## 2025-04-12 PROBLEM — G93.41 ACUTE METABOLIC ENCEPHALOPATHY: Status: ACTIVE | Noted: 2025-01-01

## 2025-04-12 PROBLEM — J96.01 ACUTE RESPIRATORY FAILURE WITH HYPOXIA  (CMD): Status: ACTIVE | Noted: 2025-01-01

## 2025-04-12 PROBLEM — E87.5 HYPERKALEMIA: Status: RESOLVED | Noted: 2025-04-12 | Resolved: 2025-04-12

## 2025-04-12 PROBLEM — N18.9 ACUTE KIDNEY INJURY SUPERIMPOSED ON CHRONIC KIDNEY DISEASE (CMD): Status: ACTIVE | Noted: 2025-01-01

## 2025-04-12 LAB
ANION GAP SERPL CALC-SCNC: 15 MMOL/L (ref 7–19)
BASE EXCESS / DEFICIT, ARTERIAL - RESPIRATORY: 6 MMOL/L (ref -2–3)
BDY SITE: ABNORMAL
BODY TEMPERATURE: 37 DEGREES C
BUN SERPL-MCNC: 37 MG/DL (ref 6–20)
BUN/CREAT SERPL: 24 (ref 7–25)
CALCIUM SERPL-MCNC: 7.9 MG/DL (ref 8.4–10.2)
CHLORIDE SERPL-SCNC: 109 MMOL/L (ref 97–110)
CO2 SERPL-SCNC: 28 MMOL/L (ref 21–32)
COHGB MFR BLDV: 0.1 %
CONDITION: ABNORMAL
CREAT SERPL-MCNC: 1.56 MG/DL (ref 0.51–0.95)
DEPRECATED RDW RBC: 51.3 FL (ref 39–50)
EGFRCR SERPLBLD CKD-EPI 2021: 37 ML/MIN/{1.73_M2}
ERYTHROCYTE [DISTWIDTH] IN BLOOD: 15.4 % (ref 11–15)
FASTING DURATION TIME PATIENT: ABNORMAL H
FIBRINOGEN PPP-MCNC: 277 MG/DL (ref 190–425)
GLUCOSE BLDC GLUCOMTR-MCNC: 156 MG/DL (ref 70–99)
GLUCOSE BLDC GLUCOMTR-MCNC: 178 MG/DL (ref 70–99)
GLUCOSE BLDC GLUCOMTR-MCNC: 189 MG/DL (ref 70–99)
GLUCOSE BLDC GLUCOMTR-MCNC: 199 MG/DL (ref 70–99)
GLUCOSE BLDC GLUCOMTR-MCNC: 276 MG/DL (ref 70–99)
GLUCOSE SERPL-MCNC: 246 MG/DL (ref 70–99)
HCO3 BLDA-SCNC: 32 MMOL/L (ref 22–28)
HCT VFR BLD CALC: 32.5 % (ref 36–46.5)
HGB BLD-MCNC: 10.2 G/DL (ref 12–15.5)
HGB BLD-MCNC: 9.6 G/DL (ref 12–15.5)
MCH RBC QN AUTO: 28.3 PG (ref 26–34)
MCHC RBC AUTO-ENTMCNC: 31.4 G/DL (ref 32–36.5)
MCV RBC AUTO: 90 FL (ref 78–100)
METHGB MFR BLDMV: 0.6 %
NRBC BLD MANUAL-RTO: 0 /100 WBC
OXYHGB MFR BLDA: 95.3 % (ref 94–98)
PCO2 BLDA: 53 MM HG (ref 32–45)
PH BLDA: 7.39 UNITS (ref 7.35–7.45)
PLATELET # BLD AUTO: 227 K/MCL (ref 140–450)
PO2 BLDA: 81 MM HG (ref 83–108)
POTASSIUM SERPL-SCNC: 4 MMOL/L (ref 3.4–5.1)
RBC # BLD: 3.61 MIL/MCL (ref 4–5.2)
SAO2 % BLDA: 13 % (ref 15–23)
SAO2 % BLDA: 96 % (ref 95–99)
SODIUM SERPL-SCNC: 148 MMOL/L (ref 135–145)
WBC # BLD: 20.4 K/MCL (ref 4.2–11)

## 2025-04-12 PROCEDURE — 10002803 HB RX 637: Performed by: INTERNAL MEDICINE

## 2025-04-12 PROCEDURE — 96372 THER/PROPH/DIAG INJ SC/IM: CPT | Performed by: HOSPITALIST

## 2025-04-12 PROCEDURE — 10002800 HB RX 250 W HCPCS: Performed by: INTERNAL MEDICINE

## 2025-04-12 PROCEDURE — 10002801 HB RX 250 W/O HCPCS: Performed by: INTERNAL MEDICINE

## 2025-04-12 PROCEDURE — 84145 PROCALCITONIN (PCT): CPT | Performed by: INTERNAL MEDICINE

## 2025-04-12 PROCEDURE — 82375 ASSAY CARBOXYHB QUANT: CPT

## 2025-04-12 PROCEDURE — 80048 BASIC METABOLIC PNL TOTAL CA: CPT | Performed by: INTERNAL MEDICINE

## 2025-04-12 PROCEDURE — 10000008 HB ROOM CHARGE ICU OR CCU

## 2025-04-12 PROCEDURE — 99233 SBSQ HOSP IP/OBS HIGH 50: CPT | Performed by: STUDENT IN AN ORGANIZED HEALTH CARE EDUCATION/TRAINING PROGRAM

## 2025-04-12 PROCEDURE — 10002800 HB RX 250 W HCPCS: Performed by: HOSPITALIST

## 2025-04-12 PROCEDURE — 85027 COMPLETE CBC AUTOMATED: CPT | Performed by: INTERNAL MEDICINE

## 2025-04-12 PROCEDURE — 85018 HEMOGLOBIN: CPT

## 2025-04-12 PROCEDURE — 99291 CRITICAL CARE FIRST HOUR: CPT | Performed by: INTERNAL MEDICINE

## 2025-04-12 PROCEDURE — 10004651 HB RX, NO CHARGE ITEM: Performed by: HOSPITALIST

## 2025-04-12 PROCEDURE — 96372 THER/PROPH/DIAG INJ SC/IM: CPT

## 2025-04-12 PROCEDURE — 10002800 HB RX 250 W HCPCS

## 2025-04-12 PROCEDURE — 94640 AIRWAY INHALATION TREATMENT: CPT

## 2025-04-12 PROCEDURE — 36600 WITHDRAWAL OF ARTERIAL BLOOD: CPT

## 2025-04-12 PROCEDURE — 87205 SMEAR GRAM STAIN: CPT | Performed by: INTERNAL MEDICINE

## 2025-04-12 PROCEDURE — 94003 VENT MGMT INPAT SUBQ DAY: CPT

## 2025-04-12 PROCEDURE — 10002807 HB RX 258: Performed by: INTERNAL MEDICINE

## 2025-04-12 PROCEDURE — 10004180 HB COUNTER-TRANSPORT

## 2025-04-12 PROCEDURE — 36415 COLL VENOUS BLD VENIPUNCTURE: CPT | Performed by: INTERNAL MEDICINE

## 2025-04-12 PROCEDURE — 85384 FIBRINOGEN ACTIVITY: CPT | Performed by: INTERNAL MEDICINE

## 2025-04-12 PROCEDURE — 10002801 HB RX 250 W/O HCPCS: Performed by: HOSPITALIST

## 2025-04-12 PROCEDURE — 10002800 HB RX 250 W HCPCS: Performed by: STUDENT IN AN ORGANIZED HEALTH CARE EDUCATION/TRAINING PROGRAM

## 2025-04-12 RX ORDER — SCOPOLAMINE 1 MG/3D
1 PATCH, EXTENDED RELEASE TRANSDERMAL
Status: DISCONTINUED | OUTPATIENT
Start: 2025-04-12 | End: 2025-04-15 | Stop reason: HOSPADM

## 2025-04-12 RX ORDER — SODIUM CHLORIDE 9 MG/ML
INJECTION, SOLUTION INTRAVENOUS ONCE
Status: COMPLETED | OUTPATIENT
Start: 2025-04-12 | End: 2025-04-12

## 2025-04-12 RX ORDER — SCOPOLAMINE 1 MG/3D
1 PATCH, EXTENDED RELEASE TRANSDERMAL
Status: DISCONTINUED | OUTPATIENT
Start: 2025-04-12 | End: 2025-04-12

## 2025-04-12 RX ORDER — CARBOXYMETHYLCELLULOSE SODIUM 5 MG/ML
1 SOLUTION/ DROPS OPHTHALMIC EVERY 4 HOURS
Status: DISCONTINUED | OUTPATIENT
Start: 2025-04-12 | End: 2025-04-15 | Stop reason: HOSPADM

## 2025-04-12 RX ADMIN — INSULIN HUMAN 3 UNITS: 100 INJECTION, SOLUTION PARENTERAL at 11:40

## 2025-04-12 RX ADMIN — HYDROCORTISONE SODIUM SUCCINATE 50 MG: 100 INJECTION, POWDER, FOR SOLUTION INTRAMUSCULAR; INTRAVENOUS at 17:00

## 2025-04-12 RX ADMIN — INSULIN HUMAN 9 UNITS: 100 INJECTION, SOLUTION PARENTERAL at 00:29

## 2025-04-12 RX ADMIN — INSULIN GLARGINE 10 UNITS: 100 INJECTION, SOLUTION SUBCUTANEOUS at 21:03

## 2025-04-12 RX ADMIN — Medication 125 MCG/HR: at 18:08

## 2025-04-12 RX ADMIN — HYDROCORTISONE SODIUM SUCCINATE 50 MG: 100 INJECTION, POWDER, FOR SOLUTION INTRAMUSCULAR; INTRAVENOUS at 00:09

## 2025-04-12 RX ADMIN — IPRATROPIUM BROMIDE AND ALBUTEROL SULFATE 3 ML: 2.5; .5 SOLUTION RESPIRATORY (INHALATION) at 12:28

## 2025-04-12 RX ADMIN — SODIUM CHLORIDE, PRESERVATIVE FREE 2 ML: 5 INJECTION INTRAVENOUS at 21:02

## 2025-04-12 RX ADMIN — NOREPINEPHRINE BITARTRATE 30 MCG/MIN: 8 INJECTION, SOLUTION INTRAVENOUS at 07:32

## 2025-04-12 RX ADMIN — VASOPRESSIN 0.04 UNITS/MIN: 0.2 INJECTION INTRAVENOUS at 11:47

## 2025-04-12 RX ADMIN — Medication 1 DROP: at 21:04

## 2025-04-12 RX ADMIN — IPRATROPIUM BROMIDE AND ALBUTEROL SULFATE 3 ML: 2.5; .5 SOLUTION RESPIRATORY (INHALATION) at 19:34

## 2025-04-12 RX ADMIN — NOREPINEPHRINE BITARTRATE 26 MCG/MIN: 8 INJECTION, SOLUTION INTRAVENOUS at 02:42

## 2025-04-12 RX ADMIN — IPRATROPIUM BROMIDE AND ALBUTEROL SULFATE 3 ML: 2.5; .5 SOLUTION RESPIRATORY (INHALATION) at 16:15

## 2025-04-12 RX ADMIN — DEXMEDETOMIDINE HYDROCHLORIDE 0.4 MCG/KG/HR: 400 INJECTION INTRAVENOUS at 21:19

## 2025-04-12 RX ADMIN — PROPOFOL INJECTABLE EMULSION 55 MCG/KG/MIN: 10 INJECTION, EMULSION INTRAVENOUS at 01:08

## 2025-04-12 RX ADMIN — VASOPRESSIN 0.04 UNITS/MIN: 0.2 INJECTION INTRAVENOUS at 03:12

## 2025-04-12 RX ADMIN — VASOPRESSIN 0.04 UNITS/MIN: 0.2 INJECTION INTRAVENOUS at 20:50

## 2025-04-12 RX ADMIN — VASOPRESSIN 0.04 UNITS/MIN: 0.2 INJECTION INTRAVENOUS at 03:01

## 2025-04-12 RX ADMIN — VASOPRESSIN 0.04 UNITS/MIN: 0.2 INJECTION INTRAVENOUS at 01:32

## 2025-04-12 RX ADMIN — HYDROCORTISONE SODIUM SUCCINATE 50 MG: 100 INJECTION, POWDER, FOR SOLUTION INTRAMUSCULAR; INTRAVENOUS at 11:40

## 2025-04-12 RX ADMIN — Medication 200 MCG/MIN: at 15:37

## 2025-04-12 RX ADMIN — SODIUM CHLORIDE, PRESERVATIVE FREE 2 ML: 5 INJECTION INTRAVENOUS at 08:51

## 2025-04-12 RX ADMIN — SCOPOLAMINE 1 PATCH: 1.5 PATCH, EXTENDED RELEASE TRANSDERMAL at 20:13

## 2025-04-12 RX ADMIN — SODIUM CHLORIDE: 9 INJECTION, SOLUTION INTRAVENOUS at 07:05

## 2025-04-12 RX ADMIN — ENOXAPARIN SODIUM 30 MG: 100 INJECTION SUBCUTANEOUS at 08:49

## 2025-04-12 RX ADMIN — DEXMEDETOMIDINE HYDROCHLORIDE 1 MCG/KG/HR: 400 INJECTION INTRAVENOUS at 03:55

## 2025-04-12 RX ADMIN — INSULIN HUMAN 3 UNITS: 100 INJECTION, SOLUTION PARENTERAL at 17:00

## 2025-04-12 RX ADMIN — Medication 40 MCG/MIN: at 07:28

## 2025-04-12 RX ADMIN — ZIPRASIDONE MESYLATE 80 MG: 20 INJECTION, POWDER, LYOPHILIZED, FOR SOLUTION INTRAMUSCULAR at 17:08

## 2025-04-12 RX ADMIN — ZIPRASIDONE MESYLATE 80 MG: 20 INJECTION, POWDER, LYOPHILIZED, FOR SOLUTION INTRAMUSCULAR at 10:36

## 2025-04-12 RX ADMIN — PANTOPRAZOLE SODIUM 40 MG: 40 INJECTION, POWDER, FOR SOLUTION INTRAVENOUS at 21:04

## 2025-04-12 RX ADMIN — SODIUM CHLORIDE 500 ML: 9 INJECTION, SOLUTION INTRAVENOUS at 03:08

## 2025-04-12 RX ADMIN — Medication 1 DROP: at 16:01

## 2025-04-12 RX ADMIN — HYDROCORTISONE SODIUM SUCCINATE 50 MG: 100 INJECTION, POWDER, FOR SOLUTION INTRAMUSCULAR; INTRAVENOUS at 06:37

## 2025-04-12 RX ADMIN — NOREPINEPHRINE BITARTRATE 10 MCG/MIN: 8 INJECTION, SOLUTION INTRAVENOUS at 12:56

## 2025-04-12 RX ADMIN — INSULIN HUMAN 3 UNITS: 100 INJECTION, SOLUTION PARENTERAL at 06:48

## 2025-04-12 RX ADMIN — SODIUM BICARBONATE: 84 INJECTION, SOLUTION INTRAVENOUS at 06:00

## 2025-04-12 RX ADMIN — PROPOFOL INJECTABLE EMULSION 45 MCG/KG/MIN: 10 INJECTION, EMULSION INTRAVENOUS at 05:58

## 2025-04-12 RX ADMIN — Medication 200 MCG/MIN: at 20:33

## 2025-04-12 RX ADMIN — SODIUM BICARBONATE: 84 INJECTION, SOLUTION INTRAVENOUS at 17:21

## 2025-04-12 RX ADMIN — IPRATROPIUM BROMIDE AND ALBUTEROL SULFATE 3 ML: 2.5; .5 SOLUTION RESPIRATORY (INHALATION) at 08:12

## 2025-04-12 ASSESSMENT — PATIENT HEALTH QUESTIONNAIRE - PHQ9
IS PATIENT ABLE TO COMPLETE PHQ2 OR PHQ9: NO, DEFER TO LATER TIME
IS PATIENT ABLE TO COMPLETE PHQ2 OR PHQ9: NO, DEFER TO LATER TIME

## 2025-04-12 ASSESSMENT — PAIN SCALES - BEHAVIORAL PAIN SCALE (BPS)
BPS_SCORE: 3

## 2025-04-13 VITALS
OXYGEN SATURATION: 85 % | BODY MASS INDEX: 25.31 KG/M2 | SYSTOLIC BLOOD PRESSURE: 106 MMHG | HEIGHT: 70 IN | WEIGHT: 176.81 LBS | RESPIRATION RATE: 19 BRPM | DIASTOLIC BLOOD PRESSURE: 53 MMHG | TEMPERATURE: 100.2 F | HEART RATE: 152 BPM

## 2025-04-13 PROBLEM — E87.0 HYPERNATREMIA: Status: ACTIVE | Noted: 2025-01-01

## 2025-04-13 LAB
ALBUMIN SERPL-MCNC: 2.5 G/DL (ref 3.4–5)
ALBUMIN SERPL-MCNC: 2.6 G/DL (ref 3.4–5)
ALBUMIN/GLOB SERPL: 0.8 {RATIO} (ref 1–2.4)
ALBUMIN/GLOB SERPL: 1.4 {RATIO} (ref 1–2.4)
ALP SERPL-CCNC: 29 UNITS/L (ref 45–117)
ALP SERPL-CCNC: 48 UNITS/L (ref 45–117)
ALT SERPL-CCNC: 12 UNITS/L
ALT SERPL-CCNC: 12 UNITS/L
AMMONIA PLAS-SCNC: 28 MCMOL/L
ANION GAP SERPL CALC-SCNC: 10 MMOL/L (ref 7–19)
ANION GAP SERPL CALC-SCNC: 7 MMOL/L (ref 7–19)
APTT PPP: >200 SEC (ref 22–32)
AST SERPL-CCNC: 15 UNITS/L
AST SERPL-CCNC: 17 UNITS/L
BACTERIA BLD CULT: NORMAL
BACTERIA BLD CULT: NORMAL
BACTERIA SPT AEROBE CULT: NORMAL
BASE EXCESS / DEFICIT, ARTERIAL - RESPIRATORY: 1 MMOL/L (ref -2–3)
BASE EXCESS / DEFICIT, ARTERIAL - RESPIRATORY: 3 MMOL/L (ref -2–3)
BASE EXCESS / DEFICIT, ARTERIAL - RESPIRATORY: 7 MMOL/L (ref -2–3)
BDY SITE: ABNORMAL
BILIRUB SERPL-MCNC: 0.4 MG/DL (ref 0.2–1)
BILIRUB SERPL-MCNC: 0.6 MG/DL (ref 0.2–1)
BODY TEMPERATURE: 37 DEGREES C
BUN SERPL-MCNC: 28 MG/DL (ref 6–20)
BUN SERPL-MCNC: 29 MG/DL (ref 6–20)
BUN/CREAT SERPL: 15 (ref 7–25)
BUN/CREAT SERPL: 20 (ref 7–25)
CA-I BLD-SCNC: 0.96 MMOL/L (ref 1.15–1.29)
CA-I BLD-SCNC: 1.03 MMOL/L (ref 1.15–1.29)
CA-I BLD-SCNC: 1.05 MMOL/L (ref 1.15–1.29)
CALCIUM SERPL-MCNC: 6.8 MG/DL (ref 8.4–10.2)
CALCIUM SERPL-MCNC: 7.3 MG/DL (ref 8.4–10.2)
CHLORIDE SERPL-SCNC: 110 MMOL/L (ref 97–110)
CHLORIDE SERPL-SCNC: 110 MMOL/L (ref 97–110)
CK SERPL-CCNC: 60 UNITS/L (ref 26–192)
CO2 SERPL-SCNC: 33 MMOL/L (ref 21–32)
CO2 SERPL-SCNC: 36 MMOL/L (ref 21–32)
COHGB MFR BLDV: 0.1 %
COHGB MFR BLDV: 0.5 %
COHGB MFR BLDV: 0.7 %
CONDITION: ABNORMAL
CREAT SERPL-MCNC: 1.39 MG/DL (ref 0.51–0.95)
CREAT SERPL-MCNC: 1.93 MG/DL (ref 0.51–0.95)
D DIMER PPP FEU-MCNC: 2.47 MG/L (FEU)
DEPRECATED RDW RBC: 54.1 FL (ref 39–50)
DEPRECATED RDW RBC: 57.7 FL (ref 39–50)
EGFRCR SERPLBLD CKD-EPI 2021: 29 ML/MIN/{1.73_M2}
EGFRCR SERPLBLD CKD-EPI 2021: 43 ML/MIN/{1.73_M2}
ERYTHROCYTE [DISTWIDTH] IN BLOOD: 15.9 % (ref 11–15)
ERYTHROCYTE [DISTWIDTH] IN BLOOD: 16.3 % (ref 11–15)
FASTING DURATION TIME PATIENT: ABNORMAL H
FASTING DURATION TIME PATIENT: ABNORMAL H
FIBRINOGEN PPP-MCNC: 161 MG/DL (ref 190–425)
FIBRINOGEN PPP-MCNC: 626 MG/DL (ref 190–425)
FIO2 ON VENT: 100 %
FIO2 ON VENT: 100 %
GLOBULIN SER-MCNC: 1.9 G/DL (ref 2–4)
GLOBULIN SER-MCNC: 3 G/DL (ref 2–4)
GLUCOSE BLDC GLUCOMTR-MCNC: 138 MG/DL (ref 70–99)
GLUCOSE BLDC GLUCOMTR-MCNC: 141 MG/DL (ref 70–99)
GLUCOSE BLDC GLUCOMTR-MCNC: 146 MG/DL (ref 70–99)
GLUCOSE BLDC GLUCOMTR-MCNC: 149 MG/DL (ref 70–99)
GLUCOSE BLDC GLUCOMTR-MCNC: 184 MG/DL (ref 70–99)
GLUCOSE BLDC GLUCOMTR-MCNC: 200 MG/DL (ref 70–99)
GLUCOSE BLDC GLUCOMTR-MCNC: 208 MG/DL (ref 70–99)
GLUCOSE SERPL-MCNC: 161 MG/DL (ref 70–99)
GLUCOSE SERPL-MCNC: 177 MG/DL (ref 70–99)
GRAM STN SPEC: NORMAL
GRAM STN SPEC: NORMAL
HCO3 BLDA-SCNC: 30 MMOL/L (ref 22–28)
HCO3 BLDA-SCNC: 31 MMOL/L (ref 22–28)
HCO3 BLDA-SCNC: 34 MMOL/L (ref 22–28)
HCT VFR BLD CALC: 29.9 % (ref 36–46.5)
HCT VFR BLD CALC: 32.4 % (ref 36–46.5)
HGB BLD-MCNC: 10 G/DL (ref 12–15.5)
HGB BLD-MCNC: 8.7 G/DL (ref 12–15.5)
HGB BLD-MCNC: 8.8 G/DL (ref 12–15.5)
HGB BLD-MCNC: 8.9 G/DL (ref 12–15.5)
HGB BLD-MCNC: 9.7 G/DL (ref 12–15.5)
INR PPP: 2.1
LACTATE BLDA-SCNC: 1.7 MMOL/L
LACTATE BLDA-SCNC: 2.4 MMOL/L
LACTATE BLDA-SCNC: 4.5 MMOL/L
LACTATE BLDV-SCNC: 3.6 MMOL/L (ref 0–2)
LDH SERPL L TO P-CCNC: 143 UNITS/L (ref 82–240)
MAGNESIUM SERPL-MCNC: 1.4 MG/DL (ref 1.7–2.4)
MCH RBC QN AUTO: 28.6 PG (ref 26–34)
MCH RBC QN AUTO: 28.6 PG (ref 26–34)
MCHC RBC AUTO-ENTMCNC: 29.8 G/DL (ref 32–36.5)
MCHC RBC AUTO-ENTMCNC: 30.9 G/DL (ref 32–36.5)
MCV RBC AUTO: 92.6 FL (ref 78–100)
MCV RBC AUTO: 96.1 FL (ref 78–100)
METHGB MFR BLDMV: 0 %
METHGB MFR BLDMV: 0.3 %
METHGB MFR BLDMV: 0.6 %
MRSA DNA SPEC QL NAA+PROBE: DETECTED
NRBC BLD MANUAL-RTO: 0 /100 WBC
NRBC BLD MANUAL-RTO: 2 /100 WBC
NT-PROBNP SERPL-MCNC: ABNORMAL PG/ML
OXYHGB MFR BLDA: 49.8 % (ref 94–98)
OXYHGB MFR BLDA: 84.4 % (ref 94–98)
OXYHGB MFR BLDA: 97 % (ref 94–98)
PCO2 BLDA: 60 MM HG (ref 32–45)
PCO2 BLDA: 70 MM HG (ref 32–45)
PCO2 BLDA: 80 MM HG (ref 32–45)
PH BLDA: 7.18 UNITS (ref 7.35–7.45)
PH BLDA: 7.25 UNITS (ref 7.35–7.45)
PH BLDA: 7.36 UNITS (ref 7.35–7.45)
PHOSPHATE SERPL-MCNC: 4.7 MG/DL (ref 2.4–4.7)
PLATELET # BLD AUTO: 120 K/MCL (ref 140–450)
PLATELET # BLD AUTO: 182 K/MCL (ref 140–450)
PO2 BLDA: 111 MM HG (ref 83–108)
PO2 BLDA: 31 MM HG (ref 83–108)
PO2 BLDA: 53 MM HG (ref 83–108)
POTASSIUM BLD-SCNC: 4 MMOL/L (ref 3.4–5.1)
POTASSIUM BLD-SCNC: 4 MMOL/L (ref 3.4–5.1)
POTASSIUM BLD-SCNC: 4.1 MMOL/L (ref 3.4–5.1)
POTASSIUM SERPL-SCNC: 3.9 MMOL/L (ref 3.4–5.1)
POTASSIUM SERPL-SCNC: 4.1 MMOL/L (ref 3.4–5.1)
PROCALCITONIN SERPL IA-MCNC: 1.71 NG/ML
PROCALCITONIN SERPL IA-MCNC: 9.38 NG/ML
PROT SERPL-MCNC: 4.5 G/DL (ref 6.4–8.2)
PROT SERPL-MCNC: 5.5 G/DL (ref 6.4–8.2)
PROTHROMBIN TIME: 20.7 SEC (ref 9.7–11.8)
RBC # BLD: 3.11 MIL/MCL (ref 4–5.2)
RBC # BLD: 3.5 MIL/MCL (ref 4–5.2)
SAO2 % BLDA: 10 % (ref 15–23)
SAO2 % BLDA: 13 % (ref 15–23)
SAO2 % BLDA: 50 % (ref 95–99)
SAO2 % BLDA: 6 % (ref 15–23)
SAO2 % BLDA: 85 % (ref 95–99)
SAO2 % BLDA: 98 % (ref 95–99)
SODIUM BLD-SCNC: 143 MMOL/L (ref 135–145)
SODIUM BLD-SCNC: 144 MMOL/L (ref 135–145)
SODIUM BLD-SCNC: 144 MMOL/L (ref 135–145)
SODIUM SERPL-SCNC: 146 MMOL/L (ref 135–145)
SODIUM SERPL-SCNC: 152 MMOL/L (ref 135–145)
TROPONIN I SERPL DL<=0.01 NG/ML-MCNC: 71 NG/L
TSH SERPL-ACNC: 0.78 MCUNITS/ML (ref 0.35–5)
WBC # BLD: 26 K/MCL (ref 4.2–11)
WBC # BLD: 6.5 K/MCL (ref 4.2–11)

## 2025-04-13 PROCEDURE — 96372 THER/PROPH/DIAG INJ SC/IM: CPT

## 2025-04-13 PROCEDURE — 94669 MECHANICAL CHEST WALL OSCILL: CPT

## 2025-04-13 PROCEDURE — 10002807 HB RX 258: Performed by: INTERNAL MEDICINE

## 2025-04-13 PROCEDURE — 10004180 HB COUNTER-TRANSPORT

## 2025-04-13 PROCEDURE — 10002801 HB RX 250 W/O HCPCS: Performed by: INTERNAL MEDICINE

## 2025-04-13 PROCEDURE — 87040 BLOOD CULTURE FOR BACTERIA: CPT | Performed by: INTERNAL MEDICINE

## 2025-04-13 PROCEDURE — 10002800 HB RX 250 W HCPCS: Performed by: INTERNAL MEDICINE

## 2025-04-13 PROCEDURE — 10002800 HB RX 250 W HCPCS: Performed by: HOSPITALIST

## 2025-04-13 PROCEDURE — 82550 ASSAY OF CK (CPK): CPT | Performed by: INTERNAL MEDICINE

## 2025-04-13 PROCEDURE — 85610 PROTHROMBIN TIME: CPT | Performed by: INTERNAL MEDICINE

## 2025-04-13 PROCEDURE — 82330 ASSAY OF CALCIUM: CPT

## 2025-04-13 PROCEDURE — 96372 THER/PROPH/DIAG INJ SC/IM: CPT | Performed by: HOSPITALIST

## 2025-04-13 PROCEDURE — 36600 WITHDRAWAL OF ARTERIAL BLOOD: CPT

## 2025-04-13 PROCEDURE — 99291 CRITICAL CARE FIRST HOUR: CPT | Performed by: INTERNAL MEDICINE

## 2025-04-13 PROCEDURE — 83605 ASSAY OF LACTIC ACID: CPT

## 2025-04-13 PROCEDURE — 10002803 HB RX 637: Performed by: INTERNAL MEDICINE

## 2025-04-13 PROCEDURE — 84132 ASSAY OF SERUM POTASSIUM: CPT

## 2025-04-13 PROCEDURE — 85384 FIBRINOGEN ACTIVITY: CPT | Performed by: INTERNAL MEDICINE

## 2025-04-13 PROCEDURE — P9045 ALBUMIN (HUMAN), 5%, 250 ML: HCPCS | Performed by: INTERNAL MEDICINE

## 2025-04-13 PROCEDURE — 80053 COMPREHEN METABOLIC PANEL: CPT | Performed by: INTERNAL MEDICINE

## 2025-04-13 PROCEDURE — 94003 VENT MGMT INPAT SUBQ DAY: CPT

## 2025-04-13 PROCEDURE — 85018 HEMOGLOBIN: CPT

## 2025-04-13 PROCEDURE — 82140 ASSAY OF AMMONIA: CPT | Performed by: INTERNAL MEDICINE

## 2025-04-13 PROCEDURE — 10000008 HB ROOM CHARGE ICU OR CCU

## 2025-04-13 PROCEDURE — 94667 MNPJ CHEST WALL 1ST: CPT

## 2025-04-13 PROCEDURE — 94640 AIRWAY INHALATION TREATMENT: CPT

## 2025-04-13 PROCEDURE — 10004651 HB RX, NO CHARGE ITEM: Performed by: HOSPITALIST

## 2025-04-13 PROCEDURE — 85027 COMPLETE CBC AUTOMATED: CPT | Performed by: INTERNAL MEDICINE

## 2025-04-13 PROCEDURE — 83880 ASSAY OF NATRIURETIC PEPTIDE: CPT | Performed by: INTERNAL MEDICINE

## 2025-04-13 PROCEDURE — 84145 PROCALCITONIN (PCT): CPT | Performed by: INTERNAL MEDICINE

## 2025-04-13 PROCEDURE — 87186 SC STD MICRODIL/AGAR DIL: CPT | Performed by: INTERNAL MEDICINE

## 2025-04-13 PROCEDURE — 84443 ASSAY THYROID STIM HORMONE: CPT | Performed by: INTERNAL MEDICINE

## 2025-04-13 PROCEDURE — 83605 ASSAY OF LACTIC ACID: CPT | Performed by: INTERNAL MEDICINE

## 2025-04-13 PROCEDURE — 82375 ASSAY CARBOXYHB QUANT: CPT

## 2025-04-13 PROCEDURE — 83010 ASSAY OF HAPTOGLOBIN QUANT: CPT | Performed by: INTERNAL MEDICINE

## 2025-04-13 PROCEDURE — 94002 VENT MGMT INPAT INIT DAY: CPT

## 2025-04-13 PROCEDURE — 84295 ASSAY OF SERUM SODIUM: CPT

## 2025-04-13 PROCEDURE — 87641 MR-STAPH DNA AMP PROBE: CPT | Performed by: INTERNAL MEDICINE

## 2025-04-13 PROCEDURE — 99233 SBSQ HOSP IP/OBS HIGH 50: CPT | Performed by: STUDENT IN AN ORGANIZED HEALTH CARE EDUCATION/TRAINING PROGRAM

## 2025-04-13 PROCEDURE — 85379 FIBRIN DEGRADATION QUANT: CPT | Performed by: INTERNAL MEDICINE

## 2025-04-13 PROCEDURE — 84100 ASSAY OF PHOSPHORUS: CPT | Performed by: INTERNAL MEDICINE

## 2025-04-13 PROCEDURE — 84484 ASSAY OF TROPONIN QUANT: CPT | Performed by: INTERNAL MEDICINE

## 2025-04-13 PROCEDURE — 83735 ASSAY OF MAGNESIUM: CPT | Performed by: INTERNAL MEDICINE

## 2025-04-13 PROCEDURE — 36415 COLL VENOUS BLD VENIPUNCTURE: CPT | Performed by: INTERNAL MEDICINE

## 2025-04-13 PROCEDURE — 83615 LACTATE (LD) (LDH) ENZYME: CPT | Performed by: INTERNAL MEDICINE

## 2025-04-13 PROCEDURE — 87154 CUL TYP ID BLD PTHGN 6+ TRGT: CPT | Performed by: INTERNAL MEDICINE

## 2025-04-13 PROCEDURE — 85730 THROMBOPLASTIN TIME PARTIAL: CPT | Performed by: INTERNAL MEDICINE

## 2025-04-13 PROCEDURE — 10002801 HB RX 250 W/O HCPCS: Performed by: HOSPITALIST

## 2025-04-13 PROCEDURE — 10002800 HB RX 250 W HCPCS

## 2025-04-13 RX ORDER — CARBOXYMETHYLCELLULOSE SODIUM 5 MG/ML
1 SOLUTION/ DROPS OPHTHALMIC PRN
Status: DISCONTINUED | OUTPATIENT
Start: 2025-04-13 | End: 2025-04-15 | Stop reason: HOSPADM

## 2025-04-13 RX ORDER — MINERAL OIL AND WHITE PETROLATUM 150; 830 MG/G; MG/G
1 OINTMENT OPHTHALMIC
Status: DISCONTINUED | OUTPATIENT
Start: 2025-04-14 | End: 2025-04-15 | Stop reason: HOSPADM

## 2025-04-13 RX ORDER — INDOMETHACIN 25 MG/1
100 CAPSULE ORAL ONCE
Status: COMPLETED | OUTPATIENT
Start: 2025-04-13 | End: 2025-04-14

## 2025-04-13 RX ORDER — HYDROCORTISONE SODIUM SUCCINATE 100 MG/2ML
100 INJECTION INTRAMUSCULAR; INTRAVENOUS EVERY 8 HOURS SCHEDULED
Status: DISCONTINUED | OUTPATIENT
Start: 2025-04-14 | End: 2025-04-13

## 2025-04-13 RX ORDER — IPRATROPIUM BROMIDE AND ALBUTEROL SULFATE 2.5; .5 MG/3ML; MG/3ML
3 SOLUTION RESPIRATORY (INHALATION)
Status: DISCONTINUED | OUTPATIENT
Start: 2025-04-13 | End: 2025-04-15 | Stop reason: HOSPADM

## 2025-04-13 RX ORDER — FUROSEMIDE 10 MG/ML
40 INJECTION INTRAMUSCULAR; INTRAVENOUS ONCE
Status: DISCONTINUED | OUTPATIENT
Start: 2025-04-13 | End: 2025-04-13

## 2025-04-13 RX ORDER — ALBUMIN HUMAN 50 G/1000ML
12.5 SOLUTION INTRAVENOUS PRN
Status: DISCONTINUED | OUTPATIENT
Start: 2025-04-13 | End: 2025-04-15 | Stop reason: HOSPADM

## 2025-04-13 RX ORDER — ALBUMIN HUMAN 50 G/1000ML
12.5 SOLUTION INTRAVENOUS ONCE
Status: COMPLETED | OUTPATIENT
Start: 2025-04-13 | End: 2025-04-14

## 2025-04-13 RX ORDER — CALCIUM GLUCONATE 20 MG/ML
1 INJECTION, SOLUTION INTRAVENOUS ONCE
Status: DISCONTINUED | OUTPATIENT
Start: 2025-04-13 | End: 2025-04-13

## 2025-04-13 RX ORDER — ALBUMIN HUMAN 50 G/1000ML
150 SOLUTION INTRAVENOUS
Status: DISCONTINUED | OUTPATIENT
Start: 2025-04-13 | End: 2025-04-15 | Stop reason: HOSPADM

## 2025-04-13 RX ORDER — CALCIUM GLUCONATE 94 MG/ML
1 INJECTION, SOLUTION INTRAVENOUS ONCE
Status: COMPLETED | OUTPATIENT
Start: 2025-04-13 | End: 2025-04-13

## 2025-04-13 RX ORDER — ALBUMIN HUMAN 50 G/1000ML
12.5 SOLUTION INTRAVENOUS ONCE
Status: COMPLETED | OUTPATIENT
Start: 2025-04-13 | End: 2025-04-13

## 2025-04-13 RX ORDER — SODIUM CHLORIDE 450 MG/100ML
INJECTION, SOLUTION INTRAVENOUS CONTINUOUS
Status: DISCONTINUED | OUTPATIENT
Start: 2025-04-13 | End: 2025-04-15

## 2025-04-13 RX ORDER — AMOXICILLIN 250 MG
2 CAPSULE ORAL AT BEDTIME
Status: DISCONTINUED | OUTPATIENT
Start: 2025-04-14 | End: 2025-04-15 | Stop reason: HOSPADM

## 2025-04-13 RX ADMIN — CALCIUM GLUCONATE 3000 MG: 98 INJECTION, SOLUTION INTRAVENOUS at 10:06

## 2025-04-13 RX ADMIN — VASOPRESSIN 0.04 UNITS/MIN: 0.2 INJECTION INTRAVENOUS at 06:00

## 2025-04-13 RX ADMIN — INSULIN HUMAN 3 UNITS: 100 INJECTION, SOLUTION PARENTERAL at 00:25

## 2025-04-13 RX ADMIN — IPRATROPIUM BROMIDE AND ALBUTEROL SULFATE 3 ML: 2.5; .5 SOLUTION RESPIRATORY (INHALATION) at 07:55

## 2025-04-13 RX ADMIN — MIDAZOLAM HYDROCHLORIDE 3 MG/HR: 1 INJECTION, SOLUTION INTRAVENOUS at 22:48

## 2025-04-13 RX ADMIN — VASOPRESSIN 0.04 UNITS/MIN: 0.2 INJECTION INTRAVENOUS at 21:53

## 2025-04-13 RX ADMIN — Medication 1 DROP: at 21:51

## 2025-04-13 RX ADMIN — DEXMEDETOMIDINE HYDROCHLORIDE 1 MCG/KG/HR: 400 INJECTION INTRAVENOUS at 21:26

## 2025-04-13 RX ADMIN — CALCIUM GLUCONATE 1 G: 98 INJECTION, SOLUTION INTRAVENOUS at 22:30

## 2025-04-13 RX ADMIN — DEXMEDETOMIDINE HYDROCHLORIDE 1 MCG/KG/HR: 400 INJECTION INTRAVENOUS at 17:14

## 2025-04-13 RX ADMIN — DEXMEDETOMIDINE HYDROCHLORIDE 0.4 MCG/KG/HR: 400 INJECTION INTRAVENOUS at 10:00

## 2025-04-13 RX ADMIN — SODIUM CHLORIDE, PRESERVATIVE FREE 2 ML: 5 INJECTION INTRAVENOUS at 08:28

## 2025-04-13 RX ADMIN — Medication 1 DROP: at 01:43

## 2025-04-13 RX ADMIN — Medication 200 MCG/MIN: at 15:40

## 2025-04-13 RX ADMIN — METHYLPREDNISOLONE SODIUM SUCCINATE 125 MG: 125 INJECTION, POWDER, FOR SOLUTION INTRAMUSCULAR; INTRAVENOUS at 20:31

## 2025-04-13 RX ADMIN — Medication 150 MCG/HR: at 15:42

## 2025-04-13 RX ADMIN — VASOPRESSIN 0.04 UNITS/MIN: 0.2 INJECTION INTRAVENOUS at 15:44

## 2025-04-13 RX ADMIN — ALBUMIN HUMAN 150 G: 0.05 INJECTION, SOLUTION INTRAVENOUS at 10:05

## 2025-04-13 RX ADMIN — MAGNESIUM SULFATE HEPTAHYDRATE 2 G: 40 INJECTION, SOLUTION INTRAVENOUS at 23:07

## 2025-04-13 RX ADMIN — ZIPRASIDONE MESYLATE 80 MG: 20 INJECTION, POWDER, LYOPHILIZED, FOR SOLUTION INTRAMUSCULAR at 18:06

## 2025-04-13 RX ADMIN — HYDROCORTISONE SODIUM SUCCINATE 50 MG: 100 INJECTION, POWDER, FOR SOLUTION INTRAMUSCULAR; INTRAVENOUS at 17:56

## 2025-04-13 RX ADMIN — ZIPRASIDONE MESYLATE 80 MG: 20 INJECTION, POWDER, LYOPHILIZED, FOR SOLUTION INTRAMUSCULAR at 08:31

## 2025-04-13 RX ADMIN — HYDROCORTISONE SODIUM SUCCINATE 50 MG: 100 INJECTION, POWDER, FOR SOLUTION INTRAMUSCULAR; INTRAVENOUS at 11:38

## 2025-04-13 RX ADMIN — ENOXAPARIN SODIUM 30 MG: 100 INJECTION SUBCUTANEOUS at 08:28

## 2025-04-13 RX ADMIN — SODIUM BICARBONATE: 84 INJECTION, SOLUTION INTRAVENOUS at 03:46

## 2025-04-13 RX ADMIN — NOREPINEPHRINE BITARTRATE 30 MCG/MIN: 8 INJECTION, SOLUTION INTRAVENOUS at 19:49

## 2025-04-13 RX ADMIN — IPRATROPIUM BROMIDE AND ALBUTEROL SULFATE 3 ML: 2.5; .5 SOLUTION RESPIRATORY (INHALATION) at 15:48

## 2025-04-13 RX ADMIN — ALBUMIN HUMAN 12.5 G: 0.05 INJECTION, SOLUTION INTRAVENOUS at 22:24

## 2025-04-13 RX ADMIN — Medication 1 DROP: at 17:56

## 2025-04-13 RX ADMIN — ALBUMIN HUMAN 12.5 G: 0.05 INJECTION, SOLUTION INTRAVENOUS at 20:22

## 2025-04-13 RX ADMIN — Medication 1 DROP: at 09:26

## 2025-04-13 RX ADMIN — PANTOPRAZOLE SODIUM 40 MG: 40 INJECTION, POWDER, FOR SOLUTION INTRAVENOUS at 21:06

## 2025-04-13 RX ADMIN — INSULIN GLARGINE 10 UNITS: 100 INJECTION, SOLUTION SUBCUTANEOUS at 21:03

## 2025-04-13 RX ADMIN — EPINEPHRINE 1 MCG/MIN: 1 INJECTION INTRAMUSCULAR; INTRAVENOUS; SUBCUTANEOUS at 22:41

## 2025-04-13 RX ADMIN — HYDROCORTISONE SODIUM SUCCINATE 50 MG: 100 INJECTION, POWDER, FOR SOLUTION INTRAMUSCULAR; INTRAVENOUS at 00:01

## 2025-04-13 RX ADMIN — Medication 1750 MG: at 22:21

## 2025-04-13 RX ADMIN — IPRATROPIUM BROMIDE AND ALBUTEROL SULFATE 3 ML: 2.5; .5 SOLUTION RESPIRATORY (INHALATION) at 11:47

## 2025-04-13 RX ADMIN — INSULIN HUMAN 6 UNITS: 100 INJECTION, SOLUTION PARENTERAL at 11:39

## 2025-04-13 RX ADMIN — Medication 200 MCG/MIN: at 01:12

## 2025-04-13 RX ADMIN — Medication 1 DROP: at 06:04

## 2025-04-13 RX ADMIN — NOREPINEPHRINE BITARTRATE 10 MCG/MIN: 8 INJECTION, SOLUTION INTRAVENOUS at 03:04

## 2025-04-13 RX ADMIN — SODIUM CHLORIDE: 4.5 INJECTION, SOLUTION INTRAVENOUS at 11:04

## 2025-04-13 RX ADMIN — Medication 200 MCG/MIN: at 20:17

## 2025-04-13 RX ADMIN — Medication 200 MCG/MIN: at 06:00

## 2025-04-13 RX ADMIN — PIPERACILLIN AND TAZOBACTAM 3.38 G: 3; .375 INJECTION, POWDER, LYOPHILIZED, FOR SOLUTION INTRAVENOUS; PARENTERAL at 21:20

## 2025-04-13 RX ADMIN — Medication 1 DROP: at 13:27

## 2025-04-13 RX ADMIN — INSULIN HUMAN 6 UNITS: 100 INJECTION, SOLUTION PARENTERAL at 23:42

## 2025-04-13 RX ADMIN — Medication 200 MCG/MIN: at 11:03

## 2025-04-13 RX ADMIN — HYDROCORTISONE SODIUM SUCCINATE 50 MG: 100 INJECTION, POWDER, FOR SOLUTION INTRAMUSCULAR; INTRAVENOUS at 06:00

## 2025-04-13 RX ADMIN — SODIUM CHLORIDE, PRESERVATIVE FREE 2 ML: 5 INJECTION INTRAVENOUS at 21:05

## 2025-04-13 RX ADMIN — IPRATROPIUM BROMIDE AND ALBUTEROL SULFATE 3 ML: 2.5; .5 SOLUTION RESPIRATORY (INHALATION) at 20:02

## 2025-04-13 RX ADMIN — ANTICOAGULANT CITRATE DEXTROSE SOLUTION FORMULA A: 12.25; 11; 3.65 SOLUTION INTRAVENOUS at 10:05

## 2025-04-13 ASSESSMENT — PAIN SCALES - BEHAVIORAL PAIN SCALE (BPS)
BPS_SCORE: 3
BPS_SCORE: 6
BPS_SCORE: 3
BPS_SCORE: 3

## 2025-04-13 ASSESSMENT — PATIENT HEALTH QUESTIONNAIRE - PHQ9: IS PATIENT ABLE TO COMPLETE PHQ2 OR PHQ9: NO, DEFER TO LATER TIME

## 2025-04-14 PROBLEM — R00.0 TACHYARRHYTHMIA: Status: ACTIVE | Noted: 2025-01-01

## 2025-04-14 LAB
ALBUMIN SERPL-MCNC: 2.5 G/DL (ref 3.4–5)
ALBUMIN/GLOB SERPL: 1.3 {RATIO} (ref 1–2.4)
ALP SERPL-CCNC: 26 UNITS/L (ref 45–117)
ALT SERPL-CCNC: 21 UNITS/L
ANION GAP SERPL CALC-SCNC: 15 MMOL/L (ref 7–19)
APTT PPP: 103 SEC (ref 22–32)
AST SERPL-CCNC: 43 UNITS/L
BASE EXCESS / DEFICIT, ARTERIAL - RESPIRATORY: -2 MMOL/L (ref -2–3)
BASE EXCESS / DEFICIT, ARTERIAL - RESPIRATORY: -4 MMOL/L (ref -2–3)
BASE EXCESS / DEFICIT, ARTERIAL - RESPIRATORY: 2 MMOL/L (ref -2–3)
BDY SITE: ABNORMAL
BILIRUB SERPL-MCNC: 1.1 MG/DL (ref 0.2–1)
BODY TEMPERATURE: 37 DEGREES C
BUN SERPL-MCNC: 33 MG/DL (ref 6–20)
BUN/CREAT SERPL: 16 (ref 7–25)
CA-I BLD-SCNC: 0.92 MMOL/L (ref 1.15–1.29)
CA-I BLD-SCNC: 1.01 MMOL/L (ref 1.15–1.29)
CA-I BLD-SCNC: 1.04 MMOL/L (ref 1.15–1.29)
CALCIUM SERPL-MCNC: 7 MG/DL (ref 8.4–10.2)
CHLORIDE SERPL-SCNC: 104 MMOL/L (ref 97–110)
CO2 SERPL-SCNC: 29 MMOL/L (ref 21–32)
COHGB MFR BLDV: 0.5 %
COHGB MFR BLDV: 0.8 %
COHGB MFR BLDV: 0.8 %
CONDITION: ABNORMAL
CREAT SERPL-MCNC: 2.02 MG/DL (ref 0.51–0.95)
DAT POLY-SP REAG RBC QL: NEGATIVE
DEPRECATED RDW RBC: 56.1 FL (ref 39–50)
EGFRCR SERPLBLD CKD-EPI 2021: 27 ML/MIN/{1.73_M2}
ERYTHROCYTE [DISTWIDTH] IN BLOOD: 16.3 % (ref 11–15)
FASTING DURATION TIME PATIENT: ABNORMAL H
FIBRINOGEN PPP-MCNC: 445 MG/DL (ref 190–425)
FIBRINOGEN PPP-MCNC: 489 MG/DL (ref 190–425)
FIO2 ON VENT: 100 %
GLOBULIN SER-MCNC: 2 G/DL (ref 2–4)
GLUCOSE BLDC GLUCOMTR-MCNC: 127 MG/DL (ref 70–99)
GLUCOSE BLDC GLUCOMTR-MCNC: 258 MG/DL (ref 70–99)
GLUCOSE BLDC GLUCOMTR-MCNC: 74 MG/DL (ref 70–99)
GLUCOSE BLDC GLUCOMTR-MCNC: 82 MG/DL (ref 70–99)
GLUCOSE SERPL-MCNC: 343 MG/DL (ref 70–99)
HAPTOGLOB SERPL-MCNC: 183 MG/DL (ref 30–200)
HCO3 BLDA-SCNC: 22 MMOL/L (ref 22–28)
HCO3 BLDA-SCNC: 26 MMOL/L (ref 22–28)
HCO3 BLDA-SCNC: 27 MMOL/L (ref 22–28)
HCT VFR BLD CALC: 28.4 % (ref 36–46.5)
HGB BLD-MCNC: 8 G/DL (ref 12–15.5)
HGB BLD-MCNC: 8.4 G/DL (ref 12–15.5)
HGB BLD-MCNC: 8.8 G/DL (ref 12–15.5)
HGB BLD-MCNC: 9.5 G/DL (ref 12–15.5)
INR PPP: 1.4
LACTATE BLDA-SCNC: 3.6 MMOL/L
LACTATE BLDA-SCNC: 4.9 MMOL/L
LACTATE BLDA-SCNC: 5.3 MMOL/L
LACTATE BLDV-SCNC: 6.4 MMOL/L (ref 0–2)
MAGNESIUM SERPL-MCNC: 2 MG/DL (ref 1.7–2.4)
MAGNESIUM SERPL-MCNC: 2.4 MG/DL (ref 1.7–2.4)
MCH RBC QN AUTO: 27.5 PG (ref 26–34)
MCHC RBC AUTO-ENTMCNC: 29.6 G/DL (ref 32–36.5)
MCV RBC AUTO: 93.1 FL (ref 78–100)
METHGB MFR BLDMV: 0.1 %
METHGB MFR BLDMV: 0.2 %
METHGB MFR BLDMV: 0.4 %
NRBC BLD MANUAL-RTO: 5 /100 WBC
OXYHGB MFR BLDA: 83.1 % (ref 94–98)
OXYHGB MFR BLDA: 89.4 % (ref 94–98)
OXYHGB MFR BLDA: 97.6 % (ref 94–98)
PCO2 BLDA: 43 MM HG (ref 32–45)
PCO2 BLDA: 45 MM HG (ref 32–45)
PCO2 BLDA: 60 MM HG (ref 32–45)
PH BLDA: 7.24 UNITS (ref 7.35–7.45)
PH BLDA: 7.31 UNITS (ref 7.35–7.45)
PH BLDA: 7.39 UNITS (ref 7.35–7.45)
PLATELET # BLD AUTO: 108 K/MCL (ref 140–450)
PO2 BLDA: 101 MM HG (ref 83–108)
PO2 BLDA: 51 MM HG (ref 83–108)
PO2 BLDA: 61 MM HG (ref 83–108)
POTASSIUM BLD-SCNC: 3.7 MMOL/L (ref 3.4–5.1)
POTASSIUM BLD-SCNC: 3.8 MMOL/L (ref 3.4–5.1)
POTASSIUM BLD-SCNC: 3.9 MMOL/L (ref 3.4–5.1)
POTASSIUM SERPL-SCNC: 3.5 MMOL/L (ref 3.4–5.1)
PROCALCITONIN SERPL IA-MCNC: >150 NG/ML
PROT SERPL-MCNC: 4.5 G/DL (ref 6.4–8.2)
PROTHROMBIN TIME: 15.1 SEC (ref 9.7–11.8)
RAINBOW EXTRA TUBES HOLD SPECIMEN: NORMAL
RBC # BLD: 3.05 MIL/MCL (ref 4–5.2)
SAO2 % BLDA: 11 % (ref 15–23)
SAO2 % BLDA: 13 % (ref 15–23)
SAO2 % BLDA: 84 % (ref 95–99)
SAO2 % BLDA: 9 % (ref 15–23)
SAO2 % BLDA: 91 % (ref 95–99)
SAO2 % BLDA: 98 % (ref 95–99)
SODIUM BLD-SCNC: 137 MMOL/L (ref 135–145)
SODIUM BLD-SCNC: 140 MMOL/L (ref 135–145)
SODIUM BLD-SCNC: 141 MMOL/L (ref 135–145)
SODIUM SERPL-SCNC: 144 MMOL/L (ref 135–145)
TROPONIN I SERPL DL<=0.01 NG/ML-MCNC: 62 NG/L
WBC # BLD: 4.3 K/MCL (ref 4.2–11)

## 2025-04-14 PROCEDURE — 85384 FIBRINOGEN ACTIVITY: CPT | Performed by: NURSE PRACTITIONER

## 2025-04-14 PROCEDURE — 82330 ASSAY OF CALCIUM: CPT

## 2025-04-14 PROCEDURE — 10002803 HB RX 637: Performed by: HOSPITALIST

## 2025-04-14 PROCEDURE — 99291 CRITICAL CARE FIRST HOUR: CPT | Performed by: INTERNAL MEDICINE

## 2025-04-14 PROCEDURE — 84132 ASSAY OF SERUM POTASSIUM: CPT

## 2025-04-14 PROCEDURE — 96372 THER/PROPH/DIAG INJ SC/IM: CPT | Performed by: HOSPITALIST

## 2025-04-14 PROCEDURE — 83735 ASSAY OF MAGNESIUM: CPT | Performed by: INTERNAL MEDICINE

## 2025-04-14 PROCEDURE — 10002803 HB RX 637: Performed by: INTERNAL MEDICINE

## 2025-04-14 PROCEDURE — 99254 IP/OBS CNSLTJ NEW/EST MOD 60: CPT | Performed by: INTERNAL MEDICINE

## 2025-04-14 PROCEDURE — 93005 ELECTROCARDIOGRAM TRACING: CPT | Performed by: INTERNAL MEDICINE

## 2025-04-14 PROCEDURE — 83605 ASSAY OF LACTIC ACID: CPT

## 2025-04-14 PROCEDURE — 84295 ASSAY OF SERUM SODIUM: CPT

## 2025-04-14 PROCEDURE — 85250 CLOT FACTOR IX PTC/CHRSTMAS: CPT | Performed by: INTERNAL MEDICINE

## 2025-04-14 PROCEDURE — 85018 HEMOGLOBIN: CPT

## 2025-04-14 PROCEDURE — 10004651 HB RX, NO CHARGE ITEM: Performed by: HOSPITALIST

## 2025-04-14 PROCEDURE — 10002807 HB RX 258: Performed by: INTERNAL MEDICINE

## 2025-04-14 PROCEDURE — 10002801 HB RX 250 W/O HCPCS: Performed by: INTERNAL MEDICINE

## 2025-04-14 PROCEDURE — 84145 PROCALCITONIN (PCT): CPT | Performed by: INTERNAL MEDICINE

## 2025-04-14 PROCEDURE — 10002800 HB RX 250 W HCPCS: Performed by: INTERNAL MEDICINE

## 2025-04-14 PROCEDURE — 93010 ELECTROCARDIOGRAM REPORT: CPT | Performed by: INTERNAL MEDICINE

## 2025-04-14 PROCEDURE — 06HY33Z INSERTION OF INFUSION DEVICE INTO LOWER VEIN, PERCUTANEOUS APPROACH: ICD-10-PCS | Performed by: INTERNAL MEDICINE

## 2025-04-14 PROCEDURE — 04HY32Z INSERTION OF MONITORING DEVICE INTO LOWER ARTERY, PERCUTANEOUS APPROACH: ICD-10-PCS | Performed by: INTERNAL MEDICINE

## 2025-04-14 PROCEDURE — 85730 THROMBOPLASTIN TIME PARTIAL: CPT | Performed by: NURSE PRACTITIONER

## 2025-04-14 PROCEDURE — 85027 COMPLETE CBC AUTOMATED: CPT | Performed by: INTERNAL MEDICINE

## 2025-04-14 PROCEDURE — 84484 ASSAY OF TROPONIN QUANT: CPT | Performed by: INTERNAL MEDICINE

## 2025-04-14 PROCEDURE — 86880 COOMBS TEST DIRECT: CPT | Performed by: INTERNAL MEDICINE

## 2025-04-14 PROCEDURE — 94003 VENT MGMT INPAT SUBQ DAY: CPT

## 2025-04-14 PROCEDURE — 83605 ASSAY OF LACTIC ACID: CPT | Performed by: INTERNAL MEDICINE

## 2025-04-14 PROCEDURE — 10002800 HB RX 250 W HCPCS: Performed by: HOSPITALIST

## 2025-04-14 PROCEDURE — 96372 THER/PROPH/DIAG INJ SC/IM: CPT

## 2025-04-14 PROCEDURE — 10002800 HB RX 250 W HCPCS

## 2025-04-14 PROCEDURE — 10000008 HB ROOM CHARGE ICU OR CCU

## 2025-04-14 PROCEDURE — 85240 CLOT FACTOR VIII AHG 1 STAGE: CPT | Performed by: INTERNAL MEDICINE

## 2025-04-14 PROCEDURE — 5A09B5K ASSISTANCE WITH RESPIRATORY VENTILATION, LESS THAN 8 CONSECUTIVE HOURS, INTUBATED PRONE POSITIONING: ICD-10-PCS | Performed by: INTERNAL MEDICINE

## 2025-04-14 PROCEDURE — 80053 COMPREHEN METABOLIC PANEL: CPT | Performed by: INTERNAL MEDICINE

## 2025-04-14 PROCEDURE — 36415 COLL VENOUS BLD VENIPUNCTURE: CPT | Performed by: INTERNAL MEDICINE

## 2025-04-14 PROCEDURE — 85384 FIBRINOGEN ACTIVITY: CPT | Performed by: INTERNAL MEDICINE

## 2025-04-14 PROCEDURE — 99233 SBSQ HOSP IP/OBS HIGH 50: CPT | Performed by: PSYCHIATRY & NEUROLOGY

## 2025-04-14 PROCEDURE — 85610 PROTHROMBIN TIME: CPT | Performed by: INTERNAL MEDICINE

## 2025-04-14 PROCEDURE — 82375 ASSAY CARBOXYHB QUANT: CPT

## 2025-04-14 PROCEDURE — 85610 PROTHROMBIN TIME: CPT | Performed by: NURSE PRACTITIONER

## 2025-04-14 PROCEDURE — 10002800 HB RX 250 W HCPCS: Performed by: NURSE PRACTITIONER

## 2025-04-14 PROCEDURE — 36600 WITHDRAWAL OF ARTERIAL BLOOD: CPT

## 2025-04-14 RX ORDER — FUROSEMIDE 10 MG/ML
40 INJECTION INTRAMUSCULAR; INTRAVENOUS ONCE
Status: DISCONTINUED | OUTPATIENT
Start: 2025-04-14 | End: 2025-04-14

## 2025-04-14 RX ORDER — POTASSIUM CHLORIDE 14.9 MG/ML
20 INJECTION INTRAVENOUS ONCE
Status: COMPLETED | OUTPATIENT
Start: 2025-04-14 | End: 2025-04-15

## 2025-04-14 RX ORDER — POTASSIUM CHLORIDE 14.9 MG/ML
20 INJECTION INTRAVENOUS ONCE
Status: COMPLETED | OUTPATIENT
Start: 2025-04-14 | End: 2025-04-14

## 2025-04-14 RX ORDER — FUROSEMIDE 10 MG/ML
40 INJECTION INTRAMUSCULAR; INTRAVENOUS
Status: DISCONTINUED | OUTPATIENT
Start: 2025-04-14 | End: 2025-04-15 | Stop reason: HOSPADM

## 2025-04-14 RX ORDER — CALCIUM GLUCONATE 20 MG/ML
2 INJECTION, SOLUTION INTRAVENOUS ONCE
Status: COMPLETED | OUTPATIENT
Start: 2025-04-14 | End: 2025-04-14

## 2025-04-14 RX ORDER — DIGOXIN 0.25 MG/ML
500 INJECTION INTRAMUSCULAR; INTRAVENOUS ONCE
Status: DISCONTINUED | OUTPATIENT
Start: 2025-04-14 | End: 2025-04-14

## 2025-04-14 RX ADMIN — ENOXAPARIN SODIUM 30 MG: 100 INJECTION SUBCUTANEOUS at 08:17

## 2025-04-14 RX ADMIN — PHENYLEPHRINE HYDROCHLORIDE 250 MCG/MIN: 10 INJECTION INTRAVENOUS at 09:24

## 2025-04-14 RX ADMIN — FUROSEMIDE 40 MG: 10 INJECTION, SOLUTION INTRAMUSCULAR; INTRAVENOUS at 11:51

## 2025-04-14 RX ADMIN — PIPERACILLIN AND TAZOBACTAM 3.38 G: 3; .375 INJECTION, POWDER, FOR SOLUTION INTRAVENOUS at 06:28

## 2025-04-14 RX ADMIN — EPINEPHRINE 6 MCG/MIN: 1 INJECTION INTRAMUSCULAR; INTRAVENOUS; SUBCUTANEOUS at 22:38

## 2025-04-14 RX ADMIN — NOREPINEPHRINE BITARTRATE 20 MCG/MIN: 8 INJECTION, SOLUTION INTRAVENOUS at 16:22

## 2025-04-14 RX ADMIN — Medication 1 DROP: at 05:21

## 2025-04-14 RX ADMIN — CISATRACURIUM BESYLATE 3 MCG/KG/MIN: 10 INJECTION INTRAVENOUS at 09:35

## 2025-04-14 RX ADMIN — PIPERACILLIN AND TAZOBACTAM 3.38 G: 3; .375 INJECTION, POWDER, FOR SOLUTION INTRAVENOUS at 21:57

## 2025-04-14 RX ADMIN — PANTOPRAZOLE SODIUM 40 MG: 40 INJECTION, POWDER, FOR SOLUTION INTRAVENOUS at 21:55

## 2025-04-14 RX ADMIN — PHENYLEPHRINE HYDROCHLORIDE 300 MCG/MIN: 10 INJECTION INTRAVENOUS at 18:09

## 2025-04-14 RX ADMIN — Medication 1 DROP: at 01:37

## 2025-04-14 RX ADMIN — FUROSEMIDE 40 MG: 10 INJECTION, SOLUTION INTRAMUSCULAR; INTRAVENOUS at 17:26

## 2025-04-14 RX ADMIN — MIDAZOLAM HYDROCHLORIDE 6 MG/HR: 1 INJECTION, SOLUTION INTRAVENOUS at 12:35

## 2025-04-14 RX ADMIN — EPINEPHRINE 12 MCG/MIN: 1 INJECTION INTRAMUSCULAR; INTRAVENOUS; SUBCUTANEOUS at 19:49

## 2025-04-14 RX ADMIN — NOREPINEPHRINE BITARTRATE 10 MCG/MIN: 8 INJECTION, SOLUTION INTRAVENOUS at 05:20

## 2025-04-14 RX ADMIN — CALCIUM GLUCONATE 2 G: 20 INJECTION, SOLUTION INTRAVENOUS at 06:19

## 2025-04-14 RX ADMIN — Medication 150 MCG/HR: at 18:07

## 2025-04-14 RX ADMIN — VASOPRESSIN 0.04 UNITS/MIN: 0.2 INJECTION INTRAVENOUS at 07:31

## 2025-04-14 RX ADMIN — Medication 1 DROP: at 14:29

## 2025-04-14 RX ADMIN — VASOPRESSIN 0.04 UNITS/MIN: 0.2 INJECTION INTRAVENOUS at 15:50

## 2025-04-14 RX ADMIN — Medication 1 DROP: at 17:26

## 2025-04-14 RX ADMIN — AMIODARONE HYDROCHLORIDE 1 MG/MIN: 1.8 INJECTION, SOLUTION INTRAVENOUS at 18:34

## 2025-04-14 RX ADMIN — METHYLPREDNISOLONE SODIUM SUCCINATE 60 MG: 125 INJECTION, POWDER, FOR SOLUTION INTRAMUSCULAR; INTRAVENOUS at 02:00

## 2025-04-14 RX ADMIN — METHYLPREDNISOLONE SODIUM SUCCINATE 60 MG: 125 INJECTION, POWDER, FOR SOLUTION INTRAMUSCULAR; INTRAVENOUS at 17:26

## 2025-04-14 RX ADMIN — EPINEPHRINE 12 MCG/MIN: 1 INJECTION INTRAMUSCULAR; INTRAVENOUS; SUBCUTANEOUS at 08:35

## 2025-04-14 RX ADMIN — SODIUM BICARBONATE 100 MEQ: 84 INJECTION INTRAVENOUS at 00:29

## 2025-04-14 RX ADMIN — POTASSIUM CHLORIDE 20 MEQ: 14.9 INJECTION, SOLUTION INTRAVENOUS at 22:09

## 2025-04-14 RX ADMIN — NOREPINEPHRINE BITARTRATE 30 MCG/MIN: 8 INJECTION, SOLUTION INTRAVENOUS at 00:37

## 2025-04-14 RX ADMIN — AMIODARONE HYDROCHLORIDE 150 MG: 1.5 INJECTION, SOLUTION INTRAVENOUS at 12:06

## 2025-04-14 RX ADMIN — MINERAL OIL, PETROLATUM 1 APPLICATION.: 425; 568 OINTMENT OPHTHALMIC at 12:03

## 2025-04-14 RX ADMIN — ACETAMINOPHEN 650 MG: 650 SUPPOSITORY RECTAL at 19:48

## 2025-04-14 RX ADMIN — MINERAL OIL, PETROLATUM 1 APPLICATION.: 425; 568 OINTMENT OPHTHALMIC at 15:51

## 2025-04-14 RX ADMIN — EPINEPHRINE 15 MCG/MIN: 1 INJECTION INTRAMUSCULAR; INTRAVENOUS; SUBCUTANEOUS at 03:32

## 2025-04-14 RX ADMIN — Medication 150 MCG/HR: at 08:26

## 2025-04-14 RX ADMIN — MAGNESIUM SULFATE HEPTAHYDRATE 2 G: 40 INJECTION, SOLUTION INTRAVENOUS at 00:29

## 2025-04-14 RX ADMIN — PIPERACILLIN AND TAZOBACTAM 3.38 G: 3; .375 INJECTION, POWDER, FOR SOLUTION INTRAVENOUS at 14:31

## 2025-04-14 RX ADMIN — Medication 200 MCG/MIN: at 04:56

## 2025-04-14 RX ADMIN — METHYLPREDNISOLONE SODIUM SUCCINATE 60 MG: 125 INJECTION, POWDER, FOR SOLUTION INTRAMUSCULAR; INTRAVENOUS at 06:32

## 2025-04-14 RX ADMIN — EPINEPHRINE 30 MCG/MIN: 1 INJECTION INTRAMUSCULAR; INTRAVENOUS; SUBCUTANEOUS at 01:11

## 2025-04-14 RX ADMIN — Medication 200 MCG/MIN: at 00:22

## 2025-04-14 RX ADMIN — CISATRACURIUM BESYLATE 3 MCG/KG/MIN: 10 INJECTION INTRAVENOUS at 01:45

## 2025-04-14 RX ADMIN — Medication 1 DROP: at 08:45

## 2025-04-14 RX ADMIN — SODIUM CHLORIDE, PRESERVATIVE FREE 2 ML: 5 INJECTION INTRAVENOUS at 21:56

## 2025-04-14 RX ADMIN — MINERAL OIL, PETROLATUM 1 APPLICATION.: 425; 568 OINTMENT OPHTHALMIC at 21:55

## 2025-04-14 RX ADMIN — INSULIN HUMAN 9 UNITS: 100 INJECTION, SOLUTION PARENTERAL at 06:21

## 2025-04-14 RX ADMIN — SENNOSIDES AND DOCUSATE SODIUM 2 TABLET: 50; 8.6 TABLET ORAL at 21:51

## 2025-04-14 RX ADMIN — VANCOMYCIN HYDROCHLORIDE 750 MG: 750 INJECTION, POWDER, LYOPHILIZED, FOR SOLUTION INTRAVENOUS at 22:05

## 2025-04-14 RX ADMIN — MINERAL OIL, PETROLATUM 1 APPLICATION.: 425; 568 OINTMENT OPHTHALMIC at 08:27

## 2025-04-14 RX ADMIN — METHYLPREDNISOLONE SODIUM SUCCINATE 60 MG: 125 INJECTION, POWDER, FOR SOLUTION INTRAMUSCULAR; INTRAVENOUS at 11:51

## 2025-04-14 RX ADMIN — SODIUM CHLORIDE, PRESERVATIVE FREE 2 ML: 5 INJECTION INTRAVENOUS at 08:21

## 2025-04-14 RX ADMIN — MINERAL OIL, PETROLATUM 1 APPLICATION.: 425; 568 OINTMENT OPHTHALMIC at 04:39

## 2025-04-14 RX ADMIN — NOREPINEPHRINE BITARTRATE 30 MCG/MIN: 1 INJECTION, SOLUTION, CONCENTRATE INTRAVENOUS at 22:40

## 2025-04-14 RX ADMIN — Medication 1 DROP: at 21:50

## 2025-04-14 RX ADMIN — POTASSIUM CHLORIDE 20 MEQ: 14.9 INJECTION, SOLUTION INTRAVENOUS at 07:43

## 2025-04-14 ASSESSMENT — PAIN SCALES - GENERAL: PAINLEVEL_OUTOF10: 0

## 2025-04-14 ASSESSMENT — PAIN SCALES - BEHAVIORAL PAIN SCALE (BPS)
BPS_SCORE: 3

## 2025-04-14 ASSESSMENT — PATIENT HEALTH QUESTIONNAIRE - PHQ9: IS PATIENT ABLE TO COMPLETE PHQ2 OR PHQ9: NO, DEFER TO LATER TIME

## 2025-04-15 VITALS
HEART RATE: 96 BPM | RESPIRATION RATE: 28 BRPM | BODY MASS INDEX: 28.37 KG/M2 | HEIGHT: 70 IN | DIASTOLIC BLOOD PRESSURE: 48 MMHG | SYSTOLIC BLOOD PRESSURE: 73 MMHG | TEMPERATURE: 98 F | WEIGHT: 198.19 LBS | OXYGEN SATURATION: 98 %

## 2025-04-15 LAB
A BAUMANNII DNA BLD POS QL NAA+NON-PROBE: NOT DETECTED
ALBUMIN SERPL-MCNC: 1.7 G/DL (ref 3.4–5)
ALP SERPL-CCNC: 31 UNITS/L (ref 45–117)
ALT SERPL-CCNC: 31 UNITS/L
ANION GAP SERPL CALC-SCNC: 23 MMOL/L (ref 7–19)
ANION GAP SERPL CALC-SCNC: 36 MMOL/L (ref 7–19)
AST SERPL-CCNC: 87 UNITS/L
ATRIAL RATE (BPM): 138
B FRAGILIS DNA BLD POS QL NAA+NON-PROBE: NOT DETECTED
BACTERIA BLD CULT: NORMAL
BACTERIA BLD CULT: NORMAL
BDY SITE: ABNORMAL
BILIRUB CONJ SERPL-MCNC: 1.3 MG/DL (ref 0–0.2)
BILIRUB SERPL-MCNC: 1.9 MG/DL (ref 0.2–1)
BODY TEMPERATURE: 37 DEGREES C
BUN SERPL-MCNC: 42 MG/DL (ref 6–20)
BUN SERPL-MCNC: 43 MG/DL (ref 6–20)
BUN/CREAT SERPL: 15 (ref 7–25)
BUN/CREAT SERPL: 16 (ref 7–25)
C ALBICANS DNA BLD POS QL NAA+NON-PROBE: NOT DETECTED
C AURIS DNA BLD POS QL NAA+NON-PROBE: NOT DETECTED
C GATTII+NEOFOR DNA BLD POS QL NAA+N-PRB: NOT DETECTED
C GLABRATA DNA BLD POS QL NAA+NON-PROBE: NOT DETECTED
C KRUSEI DNA BLD POS QL NAA+NON-PROBE: NOT DETECTED
C PARAP DNA BLD POS QL NAA+NON-PROBE: NOT DETECTED
C TROPICLS DNA BLD POS QL NAA+NON-PROBE: NOT DETECTED
CA-I BLD-SCNC: 0.97 MMOL/L (ref 1.15–1.29)
CALCIUM SERPL-MCNC: 6.6 MG/DL (ref 8.4–10.2)
CALCIUM SERPL-MCNC: 7 MG/DL (ref 8.4–10.2)
CHLORIDE SERPL-SCNC: 102 MMOL/L (ref 97–110)
CHLORIDE SERPL-SCNC: 102 MMOL/L (ref 97–110)
CO2 SERPL-SCNC: 10 MMOL/L (ref 21–32)
CO2 SERPL-SCNC: 21 MMOL/L (ref 21–32)
COHGB MFR BLDV: 0 %
CONDITION: ABNORMAL
CREAT SERPL-MCNC: 2.59 MG/DL (ref 0.51–0.95)
CREAT SERPL-MCNC: 2.84 MG/DL (ref 0.51–0.95)
D DIMER PPP FEU-MCNC: 13.71 MG/L (FEU)
DEPRECATED RDW RBC: 65.1 FL (ref 39–50)
E CLOAC COMP DNA BLD POS NAA+NON-PROBE: NOT DETECTED
E COLI DNA BLD POS QL NAA+NON-PROBE: NOT DETECTED
E FAECALIS DNA BLD POS QL NAA+NON-PROBE: NOT DETECTED
E FAECIUM DNA BLD POS QL NAA+NON-PROBE: NOT DETECTED
EGFRCR SERPLBLD CKD-EPI 2021: 18 ML/MIN/{1.73_M2}
EGFRCR SERPLBLD CKD-EPI 2021: 20 ML/MIN/{1.73_M2}
ENTEROBACTERALES DNA BLD POS NAA+N-PRB: NOT DETECTED
ERYTHROCYTE [DISTWIDTH] IN BLOOD: 17.5 % (ref 11–15)
FACT IX ACT/NOR PPP: 94 %
FACT VIII ACT/NOR PPP: 241 %
FASTING DURATION TIME PATIENT: ABNORMAL H
FASTING DURATION TIME PATIENT: ABNORMAL H
FIBRINOGEN PPP-MCNC: 534 MG/DL (ref 190–425)
GLUCOSE BLDC GLUCOMTR-MCNC: 182 MG/DL (ref 70–99)
GLUCOSE BLDC GLUCOMTR-MCNC: 39 MG/DL (ref 70–99)
GLUCOSE SERPL-MCNC: 285 MG/DL (ref 70–99)
GLUCOSE SERPL-MCNC: 80 MG/DL (ref 70–99)
GP B STREP DNA CSF QL NAA+NON-PROBE: NOT DETECTED
HAEM INFLU DNA BLD POS QL NAA+NON-PROBE: NOT DETECTED
HCT VFR BLD CALC: 31.5 % (ref 36–46.5)
HGB BLD-MCNC: 7 G/DL (ref 12–15.5)
HGB BLD-MCNC: 9 G/DL (ref 12–15.5)
K OXYTOCA DNA BLD POS QL NAA+NON-PROBE: NOT DETECTED
KLEBSIELLA SP DNA BLD POS QL NAA+NON-PRB: NOT DETECTED
KLEBSIELLA SP DNA BLD POS QL NAA+NON-PRB: NOT DETECTED
L MONOCYTOG DNA BLD POS QL NAA+NON-PROBE: NOT DETECTED
LACTATE BLDA-SCNC: >14 MMOL/L
MCH RBC QN AUTO: 28.5 PG (ref 26–34)
MCHC RBC AUTO-ENTMCNC: 28.6 G/DL (ref 32–36.5)
MCV RBC AUTO: 99.7 FL (ref 78–100)
METHGB MFR BLDMV: 0 %
N MEN DNA BLD POS QL NAA+NON-PROBE: NOT DETECTED
NRBC BLD MANUAL-RTO: 12 /100 WBC
NT-PROBNP SERPL-MCNC: ABNORMAL PG/ML
OXYHGB MFR BLDA: 91.3 % (ref 94–98)
P AERUGINOSA DNA BLD POS NAA+NON-PROBE: NOT DETECTED
PATH REV BLD -IMP: YES
PATH REV: ABNORMAL
PCO2 BLDA: 37 MM HG (ref 32–45)
PH BLDA: <6.96 UNITS (ref 7.35–7.45)
PLATELET # BLD AUTO: 44 K/MCL (ref 140–450)
PO2 BLDA: 79 MM HG (ref 83–108)
POTASSIUM BLD-SCNC: 6.4 MMOL/L (ref 3.4–5.1)
POTASSIUM SERPL-SCNC: 5.6 MMOL/L (ref 3.4–5.1)
POTASSIUM SERPL-SCNC: 5.6 MMOL/L (ref 3.4–5.1)
POTASSIUM SERPL-SCNC: 7.3 MMOL/L (ref 3.4–5.1)
PROCALCITONIN SERPL IA-MCNC: >150 NG/ML
PROT SERPL-MCNC: 4 G/DL (ref 6.4–8.2)
PROTEUS SP DNA BLD POS QL NAA+NON-PROBE: NOT DETECTED
QRS-INTERVAL (MSEC): 64
QT-INTERVAL (MSEC): 268
QTC: 406
R AXIS (DEGREES): 49
RBC # BLD: 3.16 MIL/MCL (ref 4–5.2)
REPORT TEXT: NORMAL
S LUGDUNENSIS DNA BLD POS QL NAA+NON-PRB: NOT DETECTED
S MALTOPHILIA DNA BLD POS QL NAA+NON-PRB: NOT DETECTED
S MARCESCENS DNA BLD POS NAA+NON-PROBE: NOT DETECTED
S PNEUM DNA BLD POS QL NAA+NON-PROBE: NOT DETECTED
S PYO DNA BLD POS QL NAA+NON-PROBE: NOT DETECTED
SALMONELLA DNA BLD POS QL NAA+NON-PROBE: NOT DETECTED
SAO2 % BLDA: 9 % (ref 15–23)
SAO2 % BLDA: 91 % (ref 95–99)
SODIUM BLD-SCNC: 136 MMOL/L (ref 135–145)
SODIUM SERPL-SCNC: 140 MMOL/L (ref 135–145)
SODIUM SERPL-SCNC: 141 MMOL/L (ref 135–145)
STAPHYLOCOCCUS AUREUS: ABNORMAL
STREPTOCOCCUS DNA BLD POS NAA+NON-PROBE: NOT DETECTED
T AXIS (DEGREES): 46
TROPONIN I SERPL DL<=0.01 NG/ML-MCNC: 124 NG/L
VENTRICULAR RATE EKG/MIN (BPM): 138
WBC # BLD: 5.5 K/MCL (ref 4.2–11)

## 2025-04-15 PROCEDURE — 85379 FIBRIN DEGRADATION QUANT: CPT | Performed by: INTERNAL MEDICINE

## 2025-04-15 PROCEDURE — 10004281 HB COUNTER-STAFF TIME PER 15 MIN

## 2025-04-15 PROCEDURE — 10002801 HB RX 250 W/O HCPCS: Performed by: INTERNAL MEDICINE

## 2025-04-15 PROCEDURE — 85384 FIBRINOGEN ACTIVITY: CPT | Performed by: INTERNAL MEDICINE

## 2025-04-15 PROCEDURE — 92950 HEART/LUNG RESUSCITATION CPR: CPT

## 2025-04-15 PROCEDURE — 10002807 HB RX 258: Performed by: INTERNAL MEDICINE

## 2025-04-15 PROCEDURE — 85027 COMPLETE CBC AUTOMATED: CPT | Performed by: INTERNAL MEDICINE

## 2025-04-15 PROCEDURE — 84132 ASSAY OF SERUM POTASSIUM: CPT

## 2025-04-15 PROCEDURE — 84132 ASSAY OF SERUM POTASSIUM: CPT | Performed by: INTERNAL MEDICINE

## 2025-04-15 PROCEDURE — 94003 VENT MGMT INPAT SUBQ DAY: CPT

## 2025-04-15 PROCEDURE — 10004180 HB COUNTER-TRANSPORT

## 2025-04-15 PROCEDURE — 10002800 HB RX 250 W HCPCS: Performed by: INTERNAL MEDICINE

## 2025-04-15 PROCEDURE — 82805 BLOOD GASES W/O2 SATURATION: CPT

## 2025-04-15 PROCEDURE — 84295 ASSAY OF SERUM SODIUM: CPT

## 2025-04-15 PROCEDURE — 85018 HEMOGLOBIN: CPT

## 2025-04-15 PROCEDURE — 84145 PROCALCITONIN (PCT): CPT | Performed by: INTERNAL MEDICINE

## 2025-04-15 PROCEDURE — 80076 HEPATIC FUNCTION PANEL: CPT | Performed by: INTERNAL MEDICINE

## 2025-04-15 PROCEDURE — 83605 ASSAY OF LACTIC ACID: CPT

## 2025-04-15 PROCEDURE — 83880 ASSAY OF NATRIURETIC PEPTIDE: CPT | Performed by: INTERNAL MEDICINE

## 2025-04-15 PROCEDURE — 82330 ASSAY OF CALCIUM: CPT

## 2025-04-15 PROCEDURE — 84484 ASSAY OF TROPONIN QUANT: CPT | Performed by: INTERNAL MEDICINE

## 2025-04-15 PROCEDURE — 80048 BASIC METABOLIC PNL TOTAL CA: CPT | Performed by: INTERNAL MEDICINE

## 2025-04-15 PROCEDURE — 10002803 HB RX 637: Performed by: INTERNAL MEDICINE

## 2025-04-15 RX ORDER — DEXTROSE MONOHYDRATE 25 G/50ML
25 INJECTION, SOLUTION INTRAVENOUS ONCE
Status: COMPLETED | OUTPATIENT
Start: 2025-04-15 | End: 2025-04-15

## 2025-04-15 RX ORDER — ALBUTEROL SULFATE 0.83 MG/ML
5 SOLUTION RESPIRATORY (INHALATION) ONCE
Status: DISCONTINUED | OUTPATIENT
Start: 2025-04-15 | End: 2025-04-15 | Stop reason: HOSPADM

## 2025-04-15 RX ORDER — INDOMETHACIN 25 MG/1
150 CAPSULE ORAL ONCE
Status: COMPLETED | OUTPATIENT
Start: 2025-04-15 | End: 2025-04-15

## 2025-04-15 RX ORDER — INDOMETHACIN 25 MG/1
CAPSULE ORAL PRN
Status: COMPLETED | OUTPATIENT
Start: 2025-04-15 | End: 2025-04-15

## 2025-04-15 RX ORDER — CALCIUM CHLORIDE 100 MG/ML
INJECTION INTRAVENOUS; INTRAVENTRICULAR PRN
Status: COMPLETED | OUTPATIENT
Start: 2025-04-15 | End: 2025-04-15

## 2025-04-15 RX ADMIN — SODIUM BICARBONATE 100 MEQ: 84 INJECTION, SOLUTION INTRAVENOUS at 06:04

## 2025-04-15 RX ADMIN — METHYLPREDNISOLONE SODIUM SUCCINATE 60 MG: 125 INJECTION, POWDER, FOR SOLUTION INTRAMUSCULAR; INTRAVENOUS at 00:19

## 2025-04-15 RX ADMIN — ATROPINE SULFATE 1 MG: 0.1 INJECTION INTRAVENOUS at 06:01

## 2025-04-15 RX ADMIN — Medication 1 DROP: at 01:10

## 2025-04-15 RX ADMIN — CISATRACURIUM BESYLATE 2 MCG/KG/MIN: 10 INJECTION INTRAVENOUS at 00:16

## 2025-04-15 RX ADMIN — SODIUM BICARBONATE: 84 INJECTION, SOLUTION INTRAVENOUS at 05:52

## 2025-04-15 RX ADMIN — DEXTROSE MONOHYDRATE 25 G: 25 INJECTION, SOLUTION INTRAVENOUS at 00:46

## 2025-04-15 RX ADMIN — SODIUM BICARBONATE 150 MEQ: 84 INJECTION INTRAVENOUS at 05:52

## 2025-04-15 RX ADMIN — Medication 100 MCG/HR: at 00:51

## 2025-04-15 RX ADMIN — EPINEPHRINE 1 MG: 0.1 INJECTION INTRAVENOUS at 05:42

## 2025-04-15 RX ADMIN — PHENYLEPHRINE HYDROCHLORIDE 300 MCG/MIN: 10 INJECTION INTRAVENOUS at 04:37

## 2025-04-15 RX ADMIN — Medication 1 DROP: at 04:46

## 2025-04-15 RX ADMIN — EPINEPHRINE 1 MG: 0.1 INJECTION INTRAVENOUS at 06:04

## 2025-04-15 RX ADMIN — SODIUM BICARBONATE 50 MEQ: 84 INJECTION, SOLUTION INTRAVENOUS at 06:19

## 2025-04-15 RX ADMIN — AMIODARONE HYDROCHLORIDE 1 MG/MIN: 1.8 INJECTION, SOLUTION INTRAVENOUS at 00:48

## 2025-04-15 RX ADMIN — CALCIUM CHLORIDE 1 G: 100 INJECTION INTRAVENOUS; INTRAVENTRICULAR at 06:04

## 2025-04-15 RX ADMIN — POTASSIUM CHLORIDE 20 MEQ: 14.9 INJECTION, SOLUTION INTRAVENOUS at 00:49

## 2025-04-15 RX ADMIN — EPINEPHRINE 1 MG: 0.1 INJECTION INTRAVENOUS at 06:27

## 2025-04-15 RX ADMIN — EPINEPHRINE 1 MG: 0.1 INJECTION INTRAVENOUS at 06:01

## 2025-04-15 RX ADMIN — EPINEPHRINE 1 MG: 0.1 INJECTION INTRAVENOUS at 06:17

## 2025-04-15 RX ADMIN — MINERAL OIL, PETROLATUM 1 APPLICATION.: 425; 568 OINTMENT OPHTHALMIC at 00:19

## 2025-04-15 RX ADMIN — SODIUM BICARBONATE 50 MEQ: 84 INJECTION, SOLUTION INTRAVENOUS at 06:17

## 2025-04-15 RX ADMIN — SODIUM BICARBONATE 100 MEQ: 84 INJECTION, SOLUTION INTRAVENOUS at 06:25

## 2025-04-15 RX ADMIN — EPINEPHRINE 1 MG: 0.1 INJECTION INTRAVENOUS at 06:24

## 2025-04-15 RX ADMIN — DEXTROSE MONOHYDRATE 25 G: 25 INJECTION, SOLUTION INTRAVENOUS at 05:37

## 2025-04-15 RX ADMIN — SODIUM CHLORIDE 5 UNITS: 9 INJECTION, SOLUTION INTRAVENOUS at 05:46

## 2025-04-15 RX ADMIN — MINERAL OIL, PETROLATUM 1 APPLICATION.: 425; 568 OINTMENT OPHTHALMIC at 04:44

## 2025-04-15 RX ADMIN — VASOPRESSIN 0.04 UNITS/MIN: 0.2 INJECTION INTRAVENOUS at 00:18

## 2025-04-15 RX ADMIN — EPINEPHRINE 1 MG: 0.1 INJECTION INTRAVENOUS at 06:20

## 2025-04-16 LAB
BACTERIA SPT AEROBE CULT: ABNORMAL
BACTERIA SPT AEROBE CULT: ABNORMAL
GRAM STN SPEC: ABNORMAL
GRAM STN SPEC: ABNORMAL

## 2025-04-17 LAB
APTT 2H NP PPP: 36 SEC
APTT IMM NP PPP: 34 SEC
APTT PPP: 81 SEC
BACTERIA BLD CULT: ABNORMAL
GRAM STN SPEC: ABNORMAL
PROTHROMBIN TIME: 21.4 SEC
PT 2H NP PPP: 12.9 SEC
PT IMM NP PPP: 12.7 SEC
SERVICE CMNT-IMP: ABNORMAL
THROMBIN TIME: 11.3 SEC (ref 12–17)

## 2025-04-18 LAB
ATRIAL RATE (BPM): 127
ATRIAL RATE (BPM): 192
QRS-INTERVAL (MSEC): 68
QRS-INTERVAL (MSEC): 76
QT-INTERVAL (MSEC): 176
QT-INTERVAL (MSEC): 278
QTC: 344
QTC: 404
R AXIS (DEGREES): 47
R AXIS (DEGREES): 68
REPORT TEXT: NORMAL
REPORT TEXT: NORMAL
T AXIS (DEGREES): 163
T AXIS (DEGREES): 74
VENTRICULAR RATE EKG/MIN (BPM): 127
VENTRICULAR RATE EKG/MIN (BPM): 230

## 2025-04-19 LAB — BACTERIA BLD CULT: NORMAL
